# Patient Record
Sex: FEMALE | Race: WHITE | NOT HISPANIC OR LATINO | Employment: FULL TIME | URBAN - METROPOLITAN AREA
[De-identification: names, ages, dates, MRNs, and addresses within clinical notes are randomized per-mention and may not be internally consistent; named-entity substitution may affect disease eponyms.]

---

## 2017-02-21 ENCOUNTER — TRANSCRIBE ORDERS (OUTPATIENT)
Dept: ADMINISTRATIVE | Facility: HOSPITAL | Age: 27
End: 2017-02-21

## 2017-02-21 DIAGNOSIS — R10.11 RUQ ABDOMINAL PAIN: Primary | ICD-10-CM

## 2017-03-02 ENCOUNTER — HOSPITAL ENCOUNTER (OUTPATIENT)
Dept: ULTRASOUND IMAGING | Facility: HOSPITAL | Age: 27
Discharge: HOME | End: 2017-03-02
Attending: INTERNAL MEDICINE | Admitting: INTERNAL MEDICINE

## 2017-03-02 DIAGNOSIS — R10.11 RUQ ABDOMINAL PAIN: ICD-10-CM

## 2017-03-02 PROCEDURE — 76705 ECHO EXAM OF ABDOMEN: CPT

## 2018-06-28 ENCOUNTER — HOSPITAL ENCOUNTER (EMERGENCY)
Facility: HOSPITAL | Age: 28
Discharge: HOME | End: 2018-06-28
Attending: EMERGENCY MEDICINE | Admitting: EMERGENCY MEDICINE

## 2018-06-28 ENCOUNTER — APPOINTMENT (OUTPATIENT)
Dept: GENERAL RADIOLOGY | Facility: HOSPITAL | Age: 28
End: 2018-06-28

## 2018-06-28 VITALS
DIASTOLIC BLOOD PRESSURE: 61 MMHG | SYSTOLIC BLOOD PRESSURE: 112 MMHG | HEIGHT: 63 IN | BODY MASS INDEX: 24.8 KG/M2 | TEMPERATURE: 98.3 F | RESPIRATION RATE: 16 BRPM | WEIGHT: 140 LBS | HEART RATE: 81 BPM | OXYGEN SATURATION: 100 %

## 2018-06-28 DIAGNOSIS — R00.2 HEART PALPITATIONS: Primary | ICD-10-CM

## 2018-06-28 LAB
ALBUMIN SERPL-MCNC: 4.99 G/DL (ref 3.2–4.8)
ALBUMIN/GLOB SERPL: 1.7 G/DL (ref 1.5–2.5)
ALP SERPL-CCNC: 71 U/L (ref 25–100)
ALT SERPL W P-5'-P-CCNC: 12 U/L (ref 7–40)
ANION GAP SERPL CALCULATED.3IONS-SCNC: 9 MMOL/L (ref 3–11)
AST SERPL-CCNC: 15 U/L (ref 0–33)
B-HCG UR QL: NEGATIVE
BACTERIA UR QL AUTO: ABNORMAL /HPF
BASOPHILS # BLD AUTO: 0.05 10*3/MM3 (ref 0–0.2)
BASOPHILS NFR BLD AUTO: 0.7 % (ref 0–1)
BILIRUB SERPL-MCNC: 0.6 MG/DL (ref 0.3–1.2)
BILIRUB UR QL STRIP: NEGATIVE
BUN BLD-MCNC: 11 MG/DL (ref 9–23)
BUN/CREAT SERPL: 13.8 (ref 7–25)
CALCIUM SPEC-SCNC: 9.5 MG/DL (ref 8.7–10.4)
CHLORIDE SERPL-SCNC: 106 MMOL/L (ref 99–109)
CLARITY UR: CLEAR
CO2 SERPL-SCNC: 27 MMOL/L (ref 20–31)
COLOR UR: YELLOW
CREAT BLD-MCNC: 0.8 MG/DL (ref 0.6–1.3)
D DIMER PPP FEU-MCNC: <0.19 MG/L (FEU) (ref 0–0.5)
DEPRECATED RDW RBC AUTO: 43.3 FL (ref 37–54)
EOSINOPHIL # BLD AUTO: 0.18 10*3/MM3 (ref 0–0.3)
EOSINOPHIL NFR BLD AUTO: 2.4 % (ref 0–3)
ERYTHROCYTE [DISTWIDTH] IN BLOOD BY AUTOMATED COUNT: 13.7 % (ref 11.3–14.5)
GFR SERPL CREATININE-BSD FRML MDRD: 84 ML/MIN/1.73
GLOBULIN UR ELPH-MCNC: 3 GM/DL
GLUCOSE BLD-MCNC: 109 MG/DL (ref 70–100)
GLUCOSE UR STRIP-MCNC: NEGATIVE MG/DL
HCT VFR BLD AUTO: 37.5 % (ref 34.5–44)
HGB BLD-MCNC: 12.3 G/DL (ref 11.5–15.5)
HGB UR QL STRIP.AUTO: NEGATIVE
HOLD SPECIMEN: NORMAL
HOLD SPECIMEN: NORMAL
HYALINE CASTS UR QL AUTO: ABNORMAL /LPF
IMM GRANULOCYTES # BLD: 0.01 10*3/MM3 (ref 0–0.03)
IMM GRANULOCYTES NFR BLD: 0.1 % (ref 0–0.6)
INTERNAL NEGATIVE CONTROL: NEGATIVE
INTERNAL POSITIVE CONTROL: POSITIVE
KETONES UR QL STRIP: NEGATIVE
LEUKOCYTE ESTERASE UR QL STRIP.AUTO: ABNORMAL
LYMPHOCYTES # BLD AUTO: 2.74 10*3/MM3 (ref 0.6–4.8)
LYMPHOCYTES NFR BLD AUTO: 37.2 % (ref 24–44)
Lab: NORMAL
MCH RBC QN AUTO: 28.7 PG (ref 27–31)
MCHC RBC AUTO-ENTMCNC: 32.8 G/DL (ref 32–36)
MCV RBC AUTO: 87.6 FL (ref 80–99)
MONOCYTES # BLD AUTO: 0.51 10*3/MM3 (ref 0–1)
MONOCYTES NFR BLD AUTO: 6.9 % (ref 0–12)
NEUTROPHILS # BLD AUTO: 3.89 10*3/MM3 (ref 1.5–8.3)
NEUTROPHILS NFR BLD AUTO: 52.8 % (ref 41–71)
NITRITE UR QL STRIP: NEGATIVE
NRBC BLD MANUAL-RTO: 0 /100 WBC (ref 0–0)
PH UR STRIP.AUTO: 8.5 [PH] (ref 5–8)
PLATELET # BLD AUTO: 333 10*3/MM3 (ref 150–450)
PMV BLD AUTO: 9.3 FL (ref 6–12)
POTASSIUM BLD-SCNC: 3.8 MMOL/L (ref 3.5–5.5)
PROT SERPL-MCNC: 8 G/DL (ref 5.7–8.2)
PROT UR QL STRIP: NEGATIVE
RBC # BLD AUTO: 4.28 10*6/MM3 (ref 3.89–5.14)
RBC # UR: ABNORMAL /HPF
REF LAB TEST METHOD: ABNORMAL
SODIUM BLD-SCNC: 142 MMOL/L (ref 132–146)
SP GR UR STRIP: 1.01 (ref 1–1.03)
SQUAMOUS #/AREA URNS HPF: ABNORMAL /HPF
TROPONIN I SERPL-MCNC: 0 NG/ML (ref 0–0.07)
TSH SERPL DL<=0.05 MIU/L-ACNC: 1.64 MIU/ML (ref 0.35–5.35)
UROBILINOGEN UR QL STRIP: ABNORMAL
WBC NRBC COR # BLD: 7.37 10*3/MM3 (ref 3.5–10.8)
WBC UR QL AUTO: ABNORMAL /HPF
WHOLE BLOOD HOLD SPECIMEN: NORMAL
WHOLE BLOOD HOLD SPECIMEN: NORMAL

## 2018-06-28 PROCEDURE — 71045 X-RAY EXAM CHEST 1 VIEW: CPT

## 2018-06-28 PROCEDURE — 84443 ASSAY THYROID STIM HORMONE: CPT | Performed by: NURSE PRACTITIONER

## 2018-06-28 PROCEDURE — 84484 ASSAY OF TROPONIN QUANT: CPT

## 2018-06-28 PROCEDURE — 85379 FIBRIN DEGRADATION QUANT: CPT | Performed by: NURSE PRACTITIONER

## 2018-06-28 PROCEDURE — 85025 COMPLETE CBC W/AUTO DIFF WBC: CPT | Performed by: NURSE PRACTITIONER

## 2018-06-28 PROCEDURE — 99284 EMERGENCY DEPT VISIT MOD MDM: CPT

## 2018-06-28 PROCEDURE — 93005 ELECTROCARDIOGRAM TRACING: CPT | Performed by: EMERGENCY MEDICINE

## 2018-06-28 PROCEDURE — 96360 HYDRATION IV INFUSION INIT: CPT

## 2018-06-28 PROCEDURE — 36415 COLL VENOUS BLD VENIPUNCTURE: CPT

## 2018-06-28 PROCEDURE — 80053 COMPREHEN METABOLIC PANEL: CPT | Performed by: NURSE PRACTITIONER

## 2018-06-28 PROCEDURE — 81001 URINALYSIS AUTO W/SCOPE: CPT | Performed by: NURSE PRACTITIONER

## 2018-06-28 RX ORDER — SODIUM CHLORIDE 0.9 % (FLUSH) 0.9 %
10 SYRINGE (ML) INJECTION AS NEEDED
Status: DISCONTINUED | OUTPATIENT
Start: 2018-06-28 | End: 2018-06-28 | Stop reason: HOSPADM

## 2018-06-28 RX ORDER — ESOMEPRAZOLE MAGNESIUM 40 MG/1
40 CAPSULE, DELAYED RELEASE ORAL
COMMUNITY
End: 2020-03-21 | Stop reason: HOSPADM

## 2018-06-28 RX ADMIN — SODIUM CHLORIDE 1000 ML: 9 INJECTION, SOLUTION INTRAVENOUS at 02:36

## 2018-07-02 PROBLEM — R00.2 PALPITATIONS: Status: ACTIVE | Noted: 2018-07-02

## 2019-03-11 ENCOUNTER — TRANSCRIBE ORDERS (OUTPATIENT)
Dept: ADMINISTRATIVE | Facility: HOSPITAL | Age: 29
End: 2019-03-11

## 2019-03-11 DIAGNOSIS — R00.2 HEART PALPITATIONS: Primary | ICD-10-CM

## 2019-03-13 ENCOUNTER — HOSPITAL ENCOUNTER (OUTPATIENT)
Dept: CARDIOLOGY | Facility: HOSPITAL | Age: 29
Discharge: HOME | End: 2019-03-13
Admitting: STUDENT IN AN ORGANIZED HEALTH CARE EDUCATION/TRAINING PROGRAM

## 2019-03-13 VITALS — WEIGHT: 137 LBS | BODY MASS INDEX: 24.27 KG/M2 | HEIGHT: 63 IN

## 2019-03-13 DIAGNOSIS — R00.2 HEART PALPITATIONS: ICD-10-CM

## 2019-03-13 PROCEDURE — 93306 TTE W/DOPPLER COMPLETE: CPT | Performed by: INTERNAL MEDICINE

## 2019-03-13 PROCEDURE — 93306 TTE W/DOPPLER COMPLETE: CPT

## 2019-03-14 LAB
BH CV ECHO MEAS - AO ROOT AREA (BSA CORRECTED): 1.4
BH CV ECHO MEAS - AO ROOT AREA: 4.2 CM^2
BH CV ECHO MEAS - AO ROOT DIAM: 2.3 CM
BH CV ECHO MEAS - BSA(HAYCOCK): 1.7 M^2
BH CV ECHO MEAS - BSA: 1.6 M^2
BH CV ECHO MEAS - BZI_BMI: 24.3 KILOGRAMS/M^2
BH CV ECHO MEAS - BZI_METRIC_HEIGHT: 160 CM
BH CV ECHO MEAS - BZI_METRIC_WEIGHT: 62.1 KG
BH CV ECHO MEAS - EDV(CUBED): 77.9 ML
BH CV ECHO MEAS - EDV(MOD-SP2): 45 ML
BH CV ECHO MEAS - EDV(MOD-SP4): 48 ML
BH CV ECHO MEAS - EDV(TEICH): 81.7 ML
BH CV ECHO MEAS - EF(CUBED): 74.2 %
BH CV ECHO MEAS - EF(MOD-BP): 65 %
BH CV ECHO MEAS - EF(MOD-SP2): 66.7 %
BH CV ECHO MEAS - EF(MOD-SP4): 62.5 %
BH CV ECHO MEAS - EF(TEICH): 66.3 %
BH CV ECHO MEAS - ESV(CUBED): 20.1 ML
BH CV ECHO MEAS - ESV(MOD-SP2): 15 ML
BH CV ECHO MEAS - ESV(MOD-SP4): 18 ML
BH CV ECHO MEAS - ESV(TEICH): 27.5 ML
BH CV ECHO MEAS - FS: 36.3 %
BH CV ECHO MEAS - IVS/LVPW: 0.98
BH CV ECHO MEAS - IVSD: 0.9 CM
BH CV ECHO MEAS - LA DIMENSION: 2.6 CM
BH CV ECHO MEAS - LA/AO: 1.1
BH CV ECHO MEAS - LAD MAJOR: 4.5 CM
BH CV ECHO MEAS - LAT PEAK E' VEL: 16.4 CM/SEC
BH CV ECHO MEAS - LATERAL E/E' RATIO: 4.9
BH CV ECHO MEAS - LV DIASTOLIC VOL/BSA (35-75): 29.2 ML/M^2
BH CV ECHO MEAS - LV MASS(C)D: 124.2 GRAMS
BH CV ECHO MEAS - LV MASS(C)DI: 75.4 GRAMS/M^2
BH CV ECHO MEAS - LV SYSTOLIC VOL/BSA (12-30): 10.9 ML/M^2
BH CV ECHO MEAS - LVIDD: 4.3 CM
BH CV ECHO MEAS - LVIDS: 2.7 CM
BH CV ECHO MEAS - LVLD AP2: 6 CM
BH CV ECHO MEAS - LVLD AP4: 6.8 CM
BH CV ECHO MEAS - LVLS AP2: 5.2 CM
BH CV ECHO MEAS - LVLS AP4: 5.4 CM
BH CV ECHO MEAS - LVPWD: 0.92 CM
BH CV ECHO MEAS - MED PEAK E' VEL: 11.7 CM/SEC
BH CV ECHO MEAS - MEDIAL E/E' RATIO: 6.9
BH CV ECHO MEAS - MV A MAX VEL: 53.8 CM/SEC
BH CV ECHO MEAS - MV DEC SLOPE: 387 CM/SEC^2
BH CV ECHO MEAS - MV DEC TIME: 0.24 SEC
BH CV ECHO MEAS - MV E MAX VEL: 80.9 CM/SEC
BH CV ECHO MEAS - MV E/A: 1.5
BH CV ECHO MEAS - MV P1/2T MAX VEL: 111 CM/SEC
BH CV ECHO MEAS - MV P1/2T: 84 MSEC
BH CV ECHO MEAS - MVA P1/2T LCG: 2 CM^2
BH CV ECHO MEAS - MVA(P1/2T): 2.6 CM^2
BH CV ECHO MEAS - PA ACC SLOPE: 501.5 CM/SEC^2
BH CV ECHO MEAS - PA ACC TIME: 0.15 SEC
BH CV ECHO MEAS - PA PR(ACCEL): 13.3 MMHG
BH CV ECHO MEAS - PULM DIAS VEL: 69.6 CM/SEC
BH CV ECHO MEAS - PULM S/D: 0.85
BH CV ECHO MEAS - PULM SYS VEL: 59.2 CM/SEC
BH CV ECHO MEAS - SI(CUBED): 35.1 ML/M^2
BH CV ECHO MEAS - SI(MOD-SP2): 18.2 ML/M^2
BH CV ECHO MEAS - SI(MOD-SP4): 18.2 ML/M^2
BH CV ECHO MEAS - SI(TEICH): 32.9 ML/M^2
BH CV ECHO MEAS - SV(CUBED): 57.7 ML
BH CV ECHO MEAS - SV(MOD-SP2): 30 ML
BH CV ECHO MEAS - SV(MOD-SP4): 30 ML
BH CV ECHO MEAS - SV(TEICH): 54.2 ML
BH CV ECHO MEAS - TAPSE (>1.6): 2.5 CM2
BH CV ECHO MEAS - TR MAX PG: 17 MMHG
BH CV ECHO MEAS - TR MAX VEL: 207.5 CM/SEC
BH CV ECHO MEASUREMENTS AVERAGE E/E' RATIO: 5.76
BH CV VAS BP RIGHT ARM: NORMAL MMHG
BH CV XLRA - RV BASE: 2.7 CM
BH CV XLRA - RV LENGTH: 5.4 CM
BH CV XLRA - RV MID: 2.4 CM
BH CV XLRA - TDI S': 13.7 CM/SEC
LEFT ATRIUM VOLUME INDEX: 18.2 ML/M^2
LEFT ATRIUM VOLUME: 30 ML
LV EF 2D ECHO EST: 65 %
MAXIMAL PREDICTED HEART RATE: 190 BPM
STRESS TARGET HR: 162 BPM

## 2019-09-03 ENCOUNTER — TRANSCRIBE ORDERS (OUTPATIENT)
Dept: LAB | Facility: HOSPITAL | Age: 29
End: 2019-09-03

## 2019-09-03 ENCOUNTER — LAB REQUISITION (OUTPATIENT)
Dept: LAB | Facility: HOSPITAL | Age: 29
End: 2019-09-03

## 2019-09-03 DIAGNOSIS — Z34.81 PRENATAL CARE, SUBSEQUENT PREGNANCY, FIRST TRIMESTER: ICD-10-CM

## 2019-09-03 DIAGNOSIS — Z00.00 ROUTINE GENERAL MEDICAL EXAMINATION AT A HEALTH CARE FACILITY: ICD-10-CM

## 2019-09-03 DIAGNOSIS — Z3A.12 12 WEEKS GESTATION OF PREGNANCY: Primary | ICD-10-CM

## 2019-09-03 LAB
ABO GROUP BLD: NORMAL
BASOPHILS # BLD AUTO: 0.03 10*3/MM3 (ref 0–0.2)
BASOPHILS NFR BLD AUTO: 0.3 % (ref 0–1.5)
BLD GP AB SCN SERPL QL: NEGATIVE
DEPRECATED RDW RBC AUTO: 40.8 FL (ref 37–54)
EOSINOPHIL # BLD AUTO: 0.09 10*3/MM3 (ref 0–0.4)
EOSINOPHIL NFR BLD AUTO: 0.9 % (ref 0.3–6.2)
ERYTHROCYTE [DISTWIDTH] IN BLOOD BY AUTOMATED COUNT: 12 % (ref 12.3–15.4)
GLUCOSE BLD-MCNC: 81 MG/DL (ref 65–99)
HBV SURFACE AG SERPL QL IA: NORMAL
HCT VFR BLD AUTO: 35.6 % (ref 34–46.6)
HCV AB SER DONR QL: NORMAL
HGB BLD-MCNC: 11.9 G/DL (ref 12–15.9)
HIV1+2 AB SER QL: NORMAL
IMM GRANULOCYTES # BLD AUTO: 0.03 10*3/MM3 (ref 0–0.05)
IMM GRANULOCYTES NFR BLD AUTO: 0.3 % (ref 0–0.5)
LYMPHOCYTES # BLD AUTO: 1.85 10*3/MM3 (ref 0.7–3.1)
LYMPHOCYTES NFR BLD AUTO: 18.6 % (ref 19.6–45.3)
MCH RBC QN AUTO: 31 PG (ref 26.6–33)
MCHC RBC AUTO-ENTMCNC: 33.4 G/DL (ref 31.5–35.7)
MCV RBC AUTO: 92.7 FL (ref 79–97)
MONOCYTES # BLD AUTO: 0.48 10*3/MM3 (ref 0.1–0.9)
MONOCYTES NFR BLD AUTO: 4.8 % (ref 5–12)
NEUTROPHILS # BLD AUTO: 7.46 10*3/MM3 (ref 1.7–7)
NEUTROPHILS NFR BLD AUTO: 75.1 % (ref 42.7–76)
NRBC BLD AUTO-RTO: 0 /100 WBC (ref 0–0.2)
PLATELET # BLD AUTO: 286 10*3/MM3 (ref 140–450)
PMV BLD AUTO: 9.9 FL (ref 6–12)
RBC # BLD AUTO: 3.84 10*6/MM3 (ref 3.77–5.28)
RH BLD: POSITIVE
RPR SER QL: NORMAL
RUBV IGG SERPL IA-ACNC: POSITIVE
TSH SERPL DL<=0.05 MIU/L-ACNC: 0.3 UIU/ML (ref 0.27–4.2)
WBC NRBC COR # BLD: 9.94 10*3/MM3 (ref 3.4–10.8)

## 2019-09-03 PROCEDURE — 80081 OBSTETRIC PANEL INC HIV TSTG: CPT | Performed by: NURSE PRACTITIONER

## 2019-09-03 PROCEDURE — 36415 COLL VENOUS BLD VENIPUNCTURE: CPT | Performed by: NURSE PRACTITIONER

## 2019-09-03 PROCEDURE — 86803 HEPATITIS C AB TEST: CPT | Performed by: NURSE PRACTITIONER

## 2019-09-03 PROCEDURE — 36415 COLL VENOUS BLD VENIPUNCTURE: CPT

## 2019-09-03 PROCEDURE — 84443 ASSAY THYROID STIM HORMONE: CPT | Performed by: NURSE PRACTITIONER

## 2019-09-03 PROCEDURE — 82947 ASSAY GLUCOSE BLOOD QUANT: CPT | Performed by: NURSE PRACTITIONER

## 2019-12-30 ENCOUNTER — LAB (OUTPATIENT)
Dept: LAB | Facility: HOSPITAL | Age: 29
End: 2019-12-30

## 2019-12-30 ENCOUNTER — TRANSCRIBE ORDERS (OUTPATIENT)
Dept: LAB | Facility: HOSPITAL | Age: 29
End: 2019-12-30

## 2019-12-30 DIAGNOSIS — Z3A.28 28 WEEKS GESTATION OF PREGNANCY: Primary | ICD-10-CM

## 2019-12-30 DIAGNOSIS — Z3A.28 28 WEEKS GESTATION OF PREGNANCY: ICD-10-CM

## 2019-12-30 LAB
BLD GP AB SCN SERPL QL: NEGATIVE
DEPRECATED RDW RBC AUTO: 40.1 FL (ref 37–54)
ERYTHROCYTE [DISTWIDTH] IN BLOOD BY AUTOMATED COUNT: 12.2 % (ref 12.3–15.4)
GLUCOSE 1H P 100 G GLC PO SERPL-MCNC: 103 MG/DL (ref 65–140)
HCT VFR BLD AUTO: 31.6 % (ref 34–46.6)
HGB BLD-MCNC: 10.9 G/DL (ref 12–15.9)
MCH RBC QN AUTO: 31.6 PG (ref 26.6–33)
MCHC RBC AUTO-ENTMCNC: 34.5 G/DL (ref 31.5–35.7)
MCV RBC AUTO: 91.6 FL (ref 79–97)
PLATELET # BLD AUTO: 270 10*3/MM3 (ref 140–450)
PMV BLD AUTO: 10.1 FL (ref 6–12)
RBC # BLD AUTO: 3.45 10*6/MM3 (ref 3.77–5.28)
WBC NRBC COR # BLD: 11.67 10*3/MM3 (ref 3.4–10.8)

## 2019-12-30 PROCEDURE — 85027 COMPLETE CBC AUTOMATED: CPT

## 2019-12-30 PROCEDURE — 36415 COLL VENOUS BLD VENIPUNCTURE: CPT

## 2019-12-30 PROCEDURE — 86850 RBC ANTIBODY SCREEN: CPT

## 2019-12-30 PROCEDURE — 82950 GLUCOSE TEST: CPT

## 2020-03-05 ENCOUNTER — TRANSCRIBE ORDERS (OUTPATIENT)
Dept: LAB | Facility: HOSPITAL | Age: 30
End: 2020-03-05

## 2020-03-05 ENCOUNTER — LAB (OUTPATIENT)
Dept: LAB | Facility: HOSPITAL | Age: 30
End: 2020-03-05

## 2020-03-05 DIAGNOSIS — Z34.83 PRENATAL CARE, SUBSEQUENT PREGNANCY, THIRD TRIMESTER: ICD-10-CM

## 2020-03-05 DIAGNOSIS — Z3A.36 36 WEEKS GESTATION OF PREGNANCY: ICD-10-CM

## 2020-03-05 DIAGNOSIS — Z3A.36 36 WEEKS GESTATION OF PREGNANCY: Primary | ICD-10-CM

## 2020-03-05 LAB
HCT VFR BLD AUTO: 31.7 % (ref 34–46.6)
HGB BLD-MCNC: 10.8 G/DL (ref 12–15.9)

## 2020-03-05 PROCEDURE — 85014 HEMATOCRIT: CPT

## 2020-03-05 PROCEDURE — 85018 HEMOGLOBIN: CPT

## 2020-03-05 PROCEDURE — 36415 COLL VENOUS BLD VENIPUNCTURE: CPT

## 2020-03-19 ENCOUNTER — ANESTHESIA EVENT (OUTPATIENT)
Dept: LABOR AND DELIVERY | Facility: HOSPITAL | Age: 30
End: 2020-03-19

## 2020-03-19 ENCOUNTER — HOSPITAL ENCOUNTER (INPATIENT)
Facility: HOSPITAL | Age: 30
LOS: 2 days | Discharge: HOME | End: 2020-03-21
Attending: NURSE PRACTITIONER | Admitting: OBSTETRICS & GYNECOLOGY

## 2020-03-19 ENCOUNTER — ANESTHESIA (OUTPATIENT)
Dept: LABOR AND DELIVERY | Facility: HOSPITAL | Age: 30
End: 2020-03-19

## 2020-03-19 PROBLEM — Z37.9 NORMAL LABOR: Status: RESOLVED | Noted: 2020-03-19 | Resolved: 2020-03-19

## 2020-03-19 PROBLEM — Z37.9 NORMAL LABOR: Status: ACTIVE | Noted: 2020-03-19

## 2020-03-19 LAB
ABO GROUP BLD: NORMAL
ATMOSPHERIC PRESS: ABNORMAL MM[HG]
ATMOSPHERIC PRESS: ABNORMAL MM[HG]
BASE EXCESS BLDCOA CALC-SCNC: -5.1 MMOL/L (ref 0–2)
BASE EXCESS BLDCOV CALC-SCNC: -3.5 MMOL/L (ref 0–2)
BDY SITE: ABNORMAL
BDY SITE: ABNORMAL
BLD GP AB SCN SERPL QL: NEGATIVE
BODY TEMPERATURE: 37 C
BODY TEMPERATURE: 37 C
CO2 BLDA-SCNC: 21.1 MMOL/L (ref 22–33)
CO2 BLDA-SCNC: 21.8 MMOL/L (ref 22–33)
COLLECT TME SMN: ABNORMAL
DEPRECATED RDW RBC AUTO: 43.1 FL (ref 37–54)
ERYTHROCYTE [DISTWIDTH] IN BLOOD BY AUTOMATED COUNT: 12.9 % (ref 12.3–15.4)
HCO3 BLDCOA-SCNC: 19.9 MMOL/L (ref 16.9–20.5)
HCO3 BLDCOV-SCNC: 20.8 MMOL/L (ref 18.6–21.4)
HCT VFR BLD AUTO: 31.4 % (ref 34–46.6)
HGB BLD-MCNC: 10.5 G/DL (ref 12–15.9)
HGB BLDA-MCNC: 15.3 G/DL (ref 14–18)
HGB BLDA-MCNC: 15.5 G/DL (ref 14–18)
HOROWITZ INDEX BLD+IHG-RTO: 21 %
HOROWITZ INDEX BLD+IHG-RTO: 21 %
MCH RBC QN AUTO: 30.7 PG (ref 26.6–33)
MCHC RBC AUTO-ENTMCNC: 33.4 G/DL (ref 31.5–35.7)
MCV RBC AUTO: 91.8 FL (ref 79–97)
MODALITY: ABNORMAL
MODALITY: ABNORMAL
NOTE: ABNORMAL
NOTE: ABNORMAL
PCO2 BLDCOA: 36.6 MMHG (ref 43.3–54.9)
PCO2 BLDCOV: 34.7 MM HG
PH BLDCOA: 7.35 PH UNITS (ref 7.22–7.3)
PH BLDCOV: 7.38 PH UNITS
PLATELET # BLD AUTO: 338 10*3/MM3 (ref 140–450)
PMV BLD AUTO: 10.4 FL (ref 6–12)
PO2 BLDCOA: 37.3 MMHG (ref 11.5–43.3)
PO2 BLDCOV: 28.6 MM HG
RBC # BLD AUTO: 3.42 10*6/MM3 (ref 3.77–5.28)
RH BLD: POSITIVE
SAO2 % BLDCOA: 80.3 %
SAO2 % BLDCOA: ABNORMAL %
SAO2 % BLDCOV: 67.8 %
T&S EXPIRATION DATE: NORMAL
WBC NRBC COR # BLD: 18.93 10*3/MM3 (ref 3.4–10.8)

## 2020-03-19 PROCEDURE — 36415 COLL VENOUS BLD VENIPUNCTURE: CPT | Performed by: OBSTETRICS & GYNECOLOGY

## 2020-03-19 PROCEDURE — C1755 CATHETER, INTRASPINAL: HCPCS | Performed by: ANESTHESIOLOGY

## 2020-03-19 PROCEDURE — 86900 BLOOD TYPING SEROLOGIC ABO: CPT | Performed by: OBSTETRICS & GYNECOLOGY

## 2020-03-19 PROCEDURE — 82805 BLOOD GASES W/O2 SATURATION: CPT

## 2020-03-19 PROCEDURE — 85027 COMPLETE CBC AUTOMATED: CPT | Performed by: OBSTETRICS & GYNECOLOGY

## 2020-03-19 PROCEDURE — 59025 FETAL NON-STRESS TEST: CPT

## 2020-03-19 PROCEDURE — 25010000002 ONDANSETRON PER 1 MG: Performed by: NURSE ANESTHETIST, CERTIFIED REGISTERED

## 2020-03-19 PROCEDURE — 86850 RBC ANTIBODY SCREEN: CPT | Performed by: OBSTETRICS & GYNECOLOGY

## 2020-03-19 PROCEDURE — C1755 CATHETER, INTRASPINAL: HCPCS

## 2020-03-19 PROCEDURE — 10907ZC DRAINAGE OF AMNIOTIC FLUID, THERAPEUTIC FROM PRODUCTS OF CONCEPTION, VIA NATURAL OR ARTIFICIAL OPENING: ICD-10-PCS | Performed by: ADVANCED PRACTICE MIDWIFE

## 2020-03-19 PROCEDURE — 84112 EVAL AMNIOTIC FLUID PROTEIN: CPT | Performed by: ADVANCED PRACTICE MIDWIFE

## 2020-03-19 PROCEDURE — 86901 BLOOD TYPING SEROLOGIC RH(D): CPT | Performed by: OBSTETRICS & GYNECOLOGY

## 2020-03-19 PROCEDURE — 25010000002 FENTANYL CITRATE (PF) 100 MCG/2ML SOLUTION: Performed by: NURSE ANESTHETIST, CERTIFIED REGISTERED

## 2020-03-19 PROCEDURE — 25010000002 ROPIVACAINE PER 1 MG: Performed by: NURSE ANESTHETIST, CERTIFIED REGISTERED

## 2020-03-19 PROCEDURE — 51703 INSERT BLADDER CATH COMPLEX: CPT

## 2020-03-19 RX ORDER — FENTANYL CITRATE 50 UG/ML
INJECTION, SOLUTION INTRAMUSCULAR; INTRAVENOUS AS NEEDED
Status: DISCONTINUED | OUTPATIENT
Start: 2020-03-19 | End: 2020-03-19 | Stop reason: SURG

## 2020-03-19 RX ORDER — SODIUM CHLORIDE 0.9 % (FLUSH) 0.9 %
1-10 SYRINGE (ML) INJECTION AS NEEDED
Status: DISCONTINUED | OUTPATIENT
Start: 2020-03-19 | End: 2020-03-21 | Stop reason: HOSPADM

## 2020-03-19 RX ORDER — ACETAMINOPHEN 325 MG/1
650 TABLET ORAL EVERY 4 HOURS PRN
Status: DISCONTINUED | OUTPATIENT
Start: 2020-03-19 | End: 2020-03-19 | Stop reason: HOSPADM

## 2020-03-19 RX ORDER — SODIUM CHLORIDE 0.9 % (FLUSH) 0.9 %
3 SYRINGE (ML) INJECTION EVERY 12 HOURS SCHEDULED
Status: DISCONTINUED | OUTPATIENT
Start: 2020-03-19 | End: 2020-03-19 | Stop reason: HOSPADM

## 2020-03-19 RX ORDER — MORPHINE SULFATE 2 MG/ML
2 INJECTION, SOLUTION INTRAMUSCULAR; INTRAVENOUS
Status: DISCONTINUED | OUTPATIENT
Start: 2020-03-19 | End: 2020-03-19 | Stop reason: HOSPADM

## 2020-03-19 RX ORDER — PROMETHAZINE HYDROCHLORIDE 25 MG/ML
12.5 INJECTION, SOLUTION INTRAMUSCULAR; INTRAVENOUS EVERY 6 HOURS PRN
Status: DISCONTINUED | OUTPATIENT
Start: 2020-03-19 | End: 2020-03-19 | Stop reason: HOSPADM

## 2020-03-19 RX ORDER — ONDANSETRON 2 MG/ML
4 INJECTION INTRAMUSCULAR; INTRAVENOUS EVERY 6 HOURS PRN
Status: DISCONTINUED | OUTPATIENT
Start: 2020-03-19 | End: 2020-03-21 | Stop reason: HOSPADM

## 2020-03-19 RX ORDER — OXYCODONE HYDROCHLORIDE AND ACETAMINOPHEN 5; 325 MG/1; MG/1
2 TABLET ORAL EVERY 4 HOURS PRN
Status: DISCONTINUED | OUTPATIENT
Start: 2020-03-19 | End: 2020-03-19 | Stop reason: HOSPADM

## 2020-03-19 RX ORDER — FAMOTIDINE 10 MG/ML
20 INJECTION, SOLUTION INTRAVENOUS ONCE AS NEEDED
Status: DISCONTINUED | OUTPATIENT
Start: 2020-03-19 | End: 2020-03-19 | Stop reason: HOSPADM

## 2020-03-19 RX ORDER — PRENATAL VIT/IRON FUM/FOLIC AC 27MG-0.8MG
1 TABLET ORAL DAILY
Status: DISCONTINUED | OUTPATIENT
Start: 2020-03-20 | End: 2020-03-21 | Stop reason: HOSPADM

## 2020-03-19 RX ORDER — OXYTOCIN-SODIUM CHLORIDE 0.9% IV SOLN 30 UNIT/500ML 30-0.9/5 UT/ML-%
650 SOLUTION INTRAVENOUS ONCE
Status: COMPLETED | OUTPATIENT
Start: 2020-03-19 | End: 2020-03-19

## 2020-03-19 RX ORDER — SODIUM CHLORIDE, SODIUM LACTATE, POTASSIUM CHLORIDE, CALCIUM CHLORIDE 600; 310; 30; 20 MG/100ML; MG/100ML; MG/100ML; MG/100ML
125 INJECTION, SOLUTION INTRAVENOUS CONTINUOUS
Status: DISCONTINUED | OUTPATIENT
Start: 2020-03-19 | End: 2020-03-21 | Stop reason: HOSPADM

## 2020-03-19 RX ORDER — DIPHENHYDRAMINE HYDROCHLORIDE 50 MG/ML
12.5 INJECTION INTRAMUSCULAR; INTRAVENOUS EVERY 8 HOURS PRN
Status: DISCONTINUED | OUTPATIENT
Start: 2020-03-19 | End: 2020-03-19 | Stop reason: HOSPADM

## 2020-03-19 RX ORDER — PROMETHAZINE HYDROCHLORIDE 12.5 MG/1
12.5 TABLET ORAL EVERY 6 HOURS PRN
Status: DISCONTINUED | OUTPATIENT
Start: 2020-03-19 | End: 2020-03-19 | Stop reason: HOSPADM

## 2020-03-19 RX ORDER — SODIUM CHLORIDE, SODIUM LACTATE, POTASSIUM CHLORIDE, CALCIUM CHLORIDE 600; 310; 30; 20 MG/100ML; MG/100ML; MG/100ML; MG/100ML
150 INJECTION, SOLUTION INTRAVENOUS CONTINUOUS
Status: DISCONTINUED | OUTPATIENT
Start: 2020-03-19 | End: 2020-03-21 | Stop reason: HOSPADM

## 2020-03-19 RX ORDER — METOCLOPRAMIDE HYDROCHLORIDE 5 MG/ML
10 INJECTION INTRAMUSCULAR; INTRAVENOUS ONCE AS NEEDED
Status: DISCONTINUED | OUTPATIENT
Start: 2020-03-19 | End: 2020-03-19 | Stop reason: HOSPADM

## 2020-03-19 RX ORDER — ONDANSETRON 2 MG/ML
4 INJECTION INTRAMUSCULAR; INTRAVENOUS ONCE AS NEEDED
Status: COMPLETED | OUTPATIENT
Start: 2020-03-19 | End: 2020-03-19

## 2020-03-19 RX ORDER — LIDOCAINE HYDROCHLORIDE AND EPINEPHRINE 15; 5 MG/ML; UG/ML
INJECTION, SOLUTION EPIDURAL AS NEEDED
Status: DISCONTINUED | OUTPATIENT
Start: 2020-03-19 | End: 2020-03-19 | Stop reason: SURG

## 2020-03-19 RX ORDER — IBUPROFEN 600 MG/1
600 TABLET ORAL EVERY 6 HOURS PRN
Status: DISCONTINUED | OUTPATIENT
Start: 2020-03-19 | End: 2020-03-21 | Stop reason: HOSPADM

## 2020-03-19 RX ORDER — OXYTOCIN-SODIUM CHLORIDE 0.9% IV SOLN 30 UNIT/500ML 30-0.9/5 UT/ML-%
85 SOLUTION INTRAVENOUS ONCE
Status: DISCONTINUED | OUTPATIENT
Start: 2020-03-19 | End: 2020-03-19 | Stop reason: HOSPADM

## 2020-03-19 RX ORDER — EPHEDRINE SULFATE/0.9% NACL/PF 25 MG/5 ML
10 SYRINGE (ML) INTRAVENOUS
Status: DISCONTINUED | OUTPATIENT
Start: 2020-03-19 | End: 2020-03-19 | Stop reason: HOSPADM

## 2020-03-19 RX ORDER — DOCUSATE SODIUM 100 MG/1
100 CAPSULE, LIQUID FILLED ORAL 2 TIMES DAILY PRN
Status: DISCONTINUED | OUTPATIENT
Start: 2020-03-19 | End: 2020-03-21 | Stop reason: HOSPADM

## 2020-03-19 RX ORDER — OXYTOCIN-SODIUM CHLORIDE 0.9% IV SOLN 30 UNIT/500ML 30-0.9/5 UT/ML-%
2 SOLUTION INTRAVENOUS
Status: DISCONTINUED | OUTPATIENT
Start: 2020-03-19 | End: 2020-03-21 | Stop reason: HOSPADM

## 2020-03-19 RX ORDER — LANOLIN
CREAM (ML) TOPICAL
Status: DISCONTINUED | OUTPATIENT
Start: 2020-03-19 | End: 2020-03-21 | Stop reason: HOSPADM

## 2020-03-19 RX ORDER — SODIUM CHLORIDE, SODIUM LACTATE, POTASSIUM CHLORIDE, CALCIUM CHLORIDE 600; 310; 30; 20 MG/100ML; MG/100ML; MG/100ML; MG/100ML
150 INJECTION, SOLUTION INTRAVENOUS CONTINUOUS
Status: CANCELLED | OUTPATIENT
Start: 2020-03-19

## 2020-03-19 RX ORDER — OXYTOCIN 10 [USP'U]/ML
10 INJECTION, SOLUTION INTRAMUSCULAR; INTRAVENOUS ONCE
Status: COMPLETED | OUTPATIENT
Start: 2020-03-19 | End: 2020-03-19

## 2020-03-19 RX ORDER — LIDOCAINE HYDROCHLORIDE 10 MG/ML
5 INJECTION, SOLUTION EPIDURAL; INFILTRATION; INTRACAUDAL; PERINEURAL AS NEEDED
Status: DISCONTINUED | OUTPATIENT
Start: 2020-03-19 | End: 2020-03-19 | Stop reason: HOSPADM

## 2020-03-19 RX ORDER — PROMETHAZINE HYDROCHLORIDE 12.5 MG/1
12.5 SUPPOSITORY RECTAL EVERY 6 HOURS PRN
Status: DISCONTINUED | OUTPATIENT
Start: 2020-03-19 | End: 2020-03-19 | Stop reason: HOSPADM

## 2020-03-19 RX ORDER — IBUPROFEN 600 MG/1
600 TABLET ORAL EVERY 6 HOURS PRN
Status: DISCONTINUED | OUTPATIENT
Start: 2020-03-19 | End: 2020-03-19 | Stop reason: HOSPADM

## 2020-03-19 RX ORDER — TRISODIUM CITRATE DIHYDRATE AND CITRIC ACID MONOHYDRATE 500; 334 MG/5ML; MG/5ML
30 SOLUTION ORAL ONCE
Status: DISCONTINUED | OUTPATIENT
Start: 2020-03-19 | End: 2020-03-19 | Stop reason: HOSPADM

## 2020-03-19 RX ORDER — MAGNESIUM CARB/ALUMINUM HYDROX 105-160MG
30 TABLET,CHEWABLE ORAL ONCE
Status: DISCONTINUED | OUTPATIENT
Start: 2020-03-19 | End: 2020-03-19 | Stop reason: HOSPADM

## 2020-03-19 RX ORDER — SODIUM CHLORIDE 0.9 % (FLUSH) 0.9 %
10 SYRINGE (ML) INJECTION AS NEEDED
Status: DISCONTINUED | OUTPATIENT
Start: 2020-03-19 | End: 2020-03-19 | Stop reason: HOSPADM

## 2020-03-19 RX ADMIN — SODIUM CHLORIDE, POTASSIUM CHLORIDE, SODIUM LACTATE AND CALCIUM CHLORIDE 125 ML/HR: 600; 310; 30; 20 INJECTION, SOLUTION INTRAVENOUS at 09:08

## 2020-03-19 RX ADMIN — OXYTOCIN 10 UNITS: 10 INJECTION, SOLUTION INTRAMUSCULAR; INTRAVENOUS at 21:08

## 2020-03-19 RX ADMIN — ONDANSETRON 4 MG: 2 INJECTION INTRAMUSCULAR; INTRAVENOUS at 13:32

## 2020-03-19 RX ADMIN — LIDOCAINE HYDROCHLORIDE AND EPINEPHRINE 2 ML: 15; 5 INJECTION, SOLUTION EPIDURAL at 12:49

## 2020-03-19 RX ADMIN — LIDOCAINE HYDROCHLORIDE AND EPINEPHRINE 3 ML: 15; 5 INJECTION, SOLUTION EPIDURAL at 12:46

## 2020-03-19 RX ADMIN — ROPIVACAINE HYDROCHLORIDE 10 ML: 5 INJECTION, SOLUTION EPIDURAL; INFILTRATION; PERINEURAL at 12:53

## 2020-03-19 RX ADMIN — Medication 14 ML/HR: at 12:54

## 2020-03-19 RX ADMIN — OXYTOCIN 650 ML/HR: 10 INJECTION INTRAVENOUS at 19:45

## 2020-03-19 RX ADMIN — OXYTOCIN 2 MILLI-UNITS/MIN: 10 INJECTION INTRAVENOUS at 15:35

## 2020-03-19 RX ADMIN — SODIUM CHLORIDE, POTASSIUM CHLORIDE, SODIUM LACTATE AND CALCIUM CHLORIDE 1000 ML: 600; 310; 30; 20 INJECTION, SOLUTION INTRAVENOUS at 09:32

## 2020-03-19 RX ADMIN — ACETAMINOPHEN 650 MG: 325 TABLET, FILM COATED ORAL at 14:56

## 2020-03-19 RX ADMIN — SODIUM CHLORIDE, POTASSIUM CHLORIDE, SODIUM LACTATE AND CALCIUM CHLORIDE 125 ML/HR: 600; 310; 30; 20 INJECTION, SOLUTION INTRAVENOUS at 13:08

## 2020-03-19 RX ADMIN — SODIUM CHLORIDE, POTASSIUM CHLORIDE, SODIUM LACTATE AND CALCIUM CHLORIDE 125 ML/HR: 600; 310; 30; 20 INJECTION, SOLUTION INTRAVENOUS at 10:35

## 2020-03-19 RX ADMIN — FENTANYL CITRATE 100 MCG: 50 INJECTION, SOLUTION INTRAMUSCULAR; INTRAVENOUS at 12:49

## 2020-03-19 RX ADMIN — SODIUM CHLORIDE, POTASSIUM CHLORIDE, SODIUM LACTATE AND CALCIUM CHLORIDE 150 ML/HR: 600; 310; 30; 20 INJECTION, SOLUTION INTRAVENOUS at 16:06

## 2020-03-19 RX ADMIN — SODIUM CHLORIDE, POTASSIUM CHLORIDE, SODIUM LACTATE AND CALCIUM CHLORIDE 300 ML: 600; 310; 30; 20 INJECTION, SOLUTION INTRAVENOUS at 11:32

## 2020-03-19 RX ADMIN — IBUPROFEN 600 MG: 600 TABLET, FILM COATED ORAL at 21:08

## 2020-03-19 RX ADMIN — SODIUM CHLORIDE, POTASSIUM CHLORIDE, SODIUM LACTATE AND CALCIUM CHLORIDE 125 ML/HR: 600; 310; 30; 20 INJECTION, SOLUTION INTRAVENOUS at 18:54

## 2020-03-19 NOTE — ANESTHESIA PROCEDURE NOTES
Labor Epidural      Patient reassessed immediately prior to procedure    Patient location during procedure: floor  Performed By  Anesthesiologist: Lexx Root DO  CRNA: Valencia Gu CRNA  Preanesthetic Checklist  Completed: patient identified, surgical consent, pre-op evaluation, timeout performed, IV checked, risks and benefits discussed and monitors and equipment checked  Prep:  Pt Position:sitting  Sterile Tech:cap, gloves, mask and sterile barrier  Prep:DuraPrep  Monitoring:blood pressure monitoring  Epidural Block Procedure:  Approach:midline  Guidance:landmark technique and palpation technique  Location:L4-L5  Needle Type:Tuohy  Needle Gauge:17 G  Loss of Resistance Medium: air  Loss of Resistance: 4cm  Cath Depth at skin:9 cm  Paresthesia: none  Aspiration:negative  Test Dose:negative  Number of Attempts: 1  Post Assessment:  Dressing:occlusive dressing applied and secured with tape  Pt Tolerance:patient tolerated the procedure well with no apparent complications  Complications:no

## 2020-03-19 NOTE — PROGRESS NOTES
"20  09:24  Shawnee Walker      ASSESSMENTS: Shawnee is breathing lightly through contractions.     /70   Pulse 92   Temp 98.6 °F (37 °C) (Oral)   Resp 14   Ht 160 cm (63\")   Wt 78 kg (172 lb)   Breastfeeding No   BMI 30.47 kg/m²     Fetal Heart Rate Assessment   Method: Fetal HR Assessment Method: external   Beats/min: Fetal HR (beats/min): 120   Baseline: Fetal Heart Baseline Rate: normal range   Varibility: Fetal HR Variability: moderate (amplitude range 6 to 25 bpm)   Accels: Fetal HR Accelerations: greater than/equal to 15 bpm, lasting at least 15 seconds   Decels: Fetal HR Decelerations: variables   Tracing Category:  2     Uterine Assessment   Method: Method: external tocotransducer   Frequency (min): Contraction Frequency (Minutes): x1   Ctx Count in 10 min:     Duration:     Intensity: Contraction Intensity: mild by palpation   Intensity by IUPC:     Resting Tone: Uterine Resting Tone: soft by palpation   Resting Tone by IUPC:     [place] Units:                                Presentation: vertex   Cervix: Exam by: Method: sterile exam per CN   Dilation: Cervical Dilation (cm): 3-4   Effacement: Cervical Effacement: 80%   Station: Fetal Station: -2            Lab Results   Component Value Date    WBC 18.93 (H) 2020    HGB 10.5 (L) 2020    HCT 31.4 (L) 2020    MCV 91.8 2020     2020    AST 15 2018    ALT 12 2018     Results from last 7 days   Lab Units 20  0532   ABO TYPING  O   RH TYPING  Positive   ANTIBODY SCREEN  Negative       PLAN: Talked with patient about current heart rate tracing. Discussed AROM and placing IUPC. Discussed reasons that would potentiate a  delivery. Patient and spouse VU and in agreement. Questions answered. AROM with clear fluid. IUPC placed. Dr Juarez updated on patient status.     Sekou Molina CNM  09:24  20  "

## 2020-03-19 NOTE — H&P
27[facility]  Obstetric History and Physical    Chief Complaint   Patient presents with   • Laboring       Subjective     Patient is a 29 y.o. female  currently at 40w0d, who presents for term labor  without ROM. She voices good fetal movement. She denies leaking of fluid. She states some spotting after exam. She desires a unmedicated labor and birth.     Her prenatal care is benign.  Her previous obstetric/gynecological history is non-contributory.    The following portions of the patients history were reviewed and updated as appropriate: current medications, allergies, past medical history, past surgical history, past family history, past social history and problem list .       Prenatal Information:   Maternal Prenatal Labs  Blood Type ABO Type   Date Value Ref Range Status   2020 O  Final      Rh Status RH type   Date Value Ref Range Status   2020 Positive  Final      Antibody Screen Antibody Screen   Date Value Ref Range Status   2020 Negative  Final      Gonnorhea No results found for: GCCX   Chlamydia No results found for: CLAMYDCU   RPR No results found for: RPR   Syphilis Antibody No results found for: SYPHILIS   Rubella No results found for: RUBELLAIGGIN   Hepatitis B Surface Antigen No results found for: HEPBSAG   HIV-1 Antibody No results found for: LABHIV1   Hepatitis C Antibody No results found for: HEPCAB   Rapid Urin Drug Screen No results found for: AMPMETHU, BARBITSCNUR, LABBENZSCN, LABMETHSCN, LABOPIASCN, THCURSCR, COCAINEUR, AMPHETSCREEN, PROPOXSCN, BUPRENORSCNU, METAMPSCNUR, OXYCODONESCN, TRICYCLICSCN   Group B Strep Culture Negative                 Past OB History:       OB History    Para Term  AB Living   1 0 0 0 0 0   SAB TAB Ectopic Molar Multiple Live Births   0 0 0 0 0 0      # Outcome Date GA Lbr Khoa/2nd Weight Sex Delivery Anes PTL Lv   1 Current                Past Medical History: Past Medical History:   Diagnosis Date   • Heartburn       Past  Surgical History Past Surgical History:   Procedure Laterality Date   • WISDOM TOOTH EXTRACTION        Family History: History reviewed. No pertinent family history.   Social History:  reports that she has never smoked. She has never used smokeless tobacco.   reports that she drank alcohol.   reports that she does not use drugs.        REVIEW OF SYSTEMS             Reports fetal movement is normal             Denies leakage of amniotic fluid.             Reports vaginal bleeding, describes as spotting             She reports regular contractions, frequency: Every 4-5 minutes, intensity moderate             All other systems reviewed and are negative    Objective       Vital Signs Range for the last 24 hours  Temperature: Temp:  [98.6 °F (37 °C)] 98.6 °F (37 °C)   Temp Source: Temp src: Oral   BP: BP: (107)/(70) 107/70   Pulse: Heart Rate:  [92] 92   Respirations: Resp:  [14] 14   SPO2:     O2 Amount (l/min):     O2 Devices     Weight: Weight:  [78 kg (172 lb)] 78 kg (172 lb)       Presentation: vertex   Cervix: Exam by:  LOBO Conrad RN   Dilation:  2/70/-2   Effacement:     Station:         Fetal Heart Rate Assessment   Method:  external   Beats/min:     Baseline:  125   Varibility:  moderate   Accels:  present   Decels:  variables   Tracing Category:  2    NST: REACTIVE     Uterine Assessment   Method:  external   Frequency (min):  2-6 minutes   Ctx Count in 10 min:     Duration:  60-90 seconds   Intensity:  moderate   Intensity by IUPC:     Resting Tone:  abd soft by palpation   Resting Tone by IUPC:     [place] Units:             Constitutional:  Well developed, well nourished, no acute distress, well-groomed.   Respiratory:  Resp e/e/u, normal breath sounds.   Cardiovascular:  Normal rate and rhythm, no murmurs.   Gastrointestinal:  Soft, gravid, nontender.  Uterus: Soft, nontender. Fundus appropriate for dates.  Neurologic:  Alert & oriented x 3,  no focal deficits noted.   Psychiatric:  Speech and behavior  appropriate.   Extremities: no cyanosis, clubbing or edema, no evidence of DVT.        Labs:  Lab Results   Component Value Date    WBC 18.93 (H) 03/19/2020    HGB 10.5 (L) 03/19/2020    HCT 31.4 (L) 03/19/2020    MCV 91.8 03/19/2020     03/19/2020    AST 15 06/28/2018    ALT 12 06/28/2018     Results from last 7 days   Lab Units 03/19/20  0532   ABO TYPING  O   RH TYPING  Positive   ANTIBODY SCREEN  Negative           Assessment/Plan       Normal labor        Assessment:  1.  Intrauterine pregnancy at 40w0d weeks gestation with reactive fetal status.    2.  Possible early labor without ROM  3.  Obstetrical history is non-contributory.  4.  GBS status: Negative    Plan:  1. Edwards bulb for labor augmentation  2. Plan of care has been reviewed with patient and questions answered.  3.  Risks, benefits of treatment plan have been discussed.  4.  All questions have been answered.        Sekou Molina CNM  3/19/2020  08:42

## 2020-03-19 NOTE — PROGRESS NOTES
"03/19/20  18:28  Shawnee Walker      ASSESSMENTS: Jaycee is comfortable with her epidural. She is in throne position with  supportive at BS.     /62   Pulse 80   Temp 98.3 °F (36.8 °C) (Oral)   Resp 16   Ht 160 cm (63\")   Wt 78 kg (172 lb)   Breastfeeding No   BMI 30.47 kg/m²     Fetal Heart Rate Assessment   Method: Fetal HR Assessment Method: external, fetal spiral electrode   Beats/min: Fetal HR (beats/min): 120   Baseline: Fetal Heart Baseline Rate: normal range   Varibility: Fetal HR Variability: moderate (amplitude range 6 to 25 bpm)   Accels: Fetal HR Accelerations: greater than/equal to 15 bpm, lasting at least 15 seconds   Decels: Fetal HR Decelerations: variable, prolonged x1   Tracing Category:  2     Uterine Assessment   Method: Method: IUPC (intrauterine pressure catheter)(poor tracing, pt positioned for epidural)   Frequency (min): Contraction Frequency (Minutes): 2-3   Ctx Count in 10 min:     Duration:   seconds   Intensity: Contraction Intensity: mild by palpation   Intensity by IUPC: Contraction Intensity (mm Hg) by IUPC: 80   Resting Tone: Uterine Resting Tone: soft by palpation   Resting Tone by IUPC: Uterine Resting Tone (mmHg) by IUPC: 20   [place] Units:                                Presentation: vertex   Cervix: Exam by: Method: sterile exam per CNM   Dilation: Cervical Dilation (cm): 6-7   Effacement: Cervical Effacement: 100%   Station: Fetal Station: -1            Lab Results   Component Value Date    WBC 18.93 (H) 03/19/2020    HGB 10.5 (L) 03/19/2020    HCT 31.4 (L) 03/19/2020    MCV 91.8 03/19/2020     03/19/2020    AST 15 06/28/2018    ALT 12 06/28/2018     Results from last 7 days   Lab Units 03/19/20  0532   ABO TYPING  O   RH TYPING  Positive   ANTIBODY SCREEN  Negative       PLAN: FSE applied. Oxytocin and amnioinfusion discontinued with little resting tone noted. Will continue to monitor.     Sekou Molina CNM  18:28  03/19/20  "

## 2020-03-19 NOTE — ANESTHESIA PREPROCEDURE EVALUATION
Anesthesia Evaluation     Patient summary reviewed and Nursing notes reviewed   NPO Solid Status: > 6 hours  NPO Liquid Status: < 2 hours           Airway   Mallampati: I  TM distance: >3 FB  Neck ROM: full  Dental      Pulmonary - negative pulmonary ROS   Cardiovascular - negative cardio ROS        Neuro/Psych- negative ROS  GI/Hepatic/Renal/Endo    (+)  GERD,      Musculoskeletal (-) negative ROS    Abdominal    Substance History - negative use     OB/GYN    (+) Pregnant,         Other - negative ROS                     Anesthesia Plan    ASA 2     epidural       Anesthetic plan, all risks, benefits, and alternatives have been provided, discussed and informed consent has been obtained with: patient.

## 2020-03-19 NOTE — PROGRESS NOTES
"03/19/20  12:22  Shawnee CHARLES Denise      ASSESSMENTS: Jaycee has decided she'd like an epidural. She is breathing with contractions with good control currently.     /66   Pulse 81   Temp 99.2 °F (37.3 °C) (Oral)   Resp 16   Ht 160 cm (63\")   Wt 78 kg (172 lb)   Breastfeeding No   BMI 30.47 kg/m²     Fetal Heart Rate Assessment   Method: Fetal HR Assessment Method: (P) external(poor tracing, pt walking)   Beats/min: Fetal HR (beats/min): 125   Baseline: Fetal Heart Baseline Rate: normal range   Varibility: Fetal HR Variability: moderate (amplitude range 6 to 25 bpm)   Accels: Fetal HR Accelerations: present   Decels: Fetal HR Decelerations: early, variable   Tracing Category:  2     Uterine Assessment   Method: Method: IUPC (intrauterine pressure catheter)   Frequency (min): Contraction Frequency (Minutes): 2-5   Ctx Count in 10 min:     Duration:     Intensity: Contraction Intensity: mild by palpation   Intensity by IUPC: Contraction Intensity (mm Hg) by IUPC: 80   Resting Tone: Uterine Resting Tone: soft by palpation   Resting Tone by IUPC: Uterine Resting Tone (mmHg) by IUPC: 20   [place] Units:                                Presentation: vertex   Cervix: Exam by: Method: sterile exam per CNM   Dilation: Cervical Dilation (cm): 4   Effacement: Cervical Effacement: 90%   Station: Fetal Station: -1            Lab Results   Component Value Date    WBC 18.93 (H) 03/19/2020    HGB 10.5 (L) 03/19/2020    HCT 31.4 (L) 03/19/2020    MCV 91.8 03/19/2020     03/19/2020    AST 15 06/28/2018    ALT 12 06/28/2018     Results from last 7 days   Lab Units 03/19/20  0532   ABO TYPING  O   RH TYPING  Positive   ANTIBODY SCREEN  Negative       PLAN: Amnioinfusion infusing. Clear fluid returned. May have epidural. Consider oxytocin augmentation as needed and tolerated by FHR tracing.     Sekou Molina CNM  12:22  03/19/20  "

## 2020-03-20 LAB
BASOPHILS # BLD AUTO: 0.07 10*3/MM3 (ref 0–0.2)
BASOPHILS NFR BLD AUTO: 0.3 % (ref 0–1.5)
DEPRECATED RDW RBC AUTO: 45.4 FL (ref 37–54)
EOSINOPHIL # BLD AUTO: 0.06 10*3/MM3 (ref 0–0.4)
EOSINOPHIL NFR BLD AUTO: 0.3 % (ref 0.3–6.2)
ERYTHROCYTE [DISTWIDTH] IN BLOOD BY AUTOMATED COUNT: 13.1 % (ref 12.3–15.4)
HCT VFR BLD AUTO: 29.2 % (ref 34–46.6)
HGB BLD-MCNC: 9.6 G/DL (ref 12–15.9)
IMM GRANULOCYTES # BLD AUTO: 0.19 10*3/MM3 (ref 0–0.05)
IMM GRANULOCYTES NFR BLD AUTO: 0.8 % (ref 0–0.5)
LYMPHOCYTES # BLD AUTO: 2.11 10*3/MM3 (ref 0.7–3.1)
LYMPHOCYTES NFR BLD AUTO: 9.4 % (ref 19.6–45.3)
MCH RBC QN AUTO: 31.2 PG (ref 26.6–33)
MCHC RBC AUTO-ENTMCNC: 32.9 G/DL (ref 31.5–35.7)
MCV RBC AUTO: 94.8 FL (ref 79–97)
MONOCYTES # BLD AUTO: 1.3 10*3/MM3 (ref 0.1–0.9)
MONOCYTES NFR BLD AUTO: 5.8 % (ref 5–12)
NEUTROPHILS # BLD AUTO: 18.79 10*3/MM3 (ref 1.7–7)
NEUTROPHILS NFR BLD AUTO: 83.4 % (ref 42.7–76)
NRBC BLD AUTO-RTO: 0 /100 WBC (ref 0–0.2)
PLATELET # BLD AUTO: 310 10*3/MM3 (ref 140–450)
PMV BLD AUTO: 10.4 FL (ref 6–12)
RBC # BLD AUTO: 3.08 10*6/MM3 (ref 3.77–5.28)
WBC NRBC COR # BLD: 22.52 10*3/MM3 (ref 3.4–10.8)

## 2020-03-20 PROCEDURE — 0HQ9XZZ REPAIR PERINEUM SKIN, EXTERNAL APPROACH: ICD-10-PCS | Performed by: ADVANCED PRACTICE MIDWIFE

## 2020-03-20 PROCEDURE — 85025 COMPLETE CBC W/AUTO DIFF WBC: CPT | Performed by: ADVANCED PRACTICE MIDWIFE

## 2020-03-20 RX ORDER — FERROUS SULFATE 325(65) MG
325 TABLET ORAL 2 TIMES DAILY WITH MEALS
Status: DISCONTINUED | OUTPATIENT
Start: 2020-03-20 | End: 2020-03-21 | Stop reason: HOSPADM

## 2020-03-20 RX ADMIN — IBUPROFEN 600 MG: 600 TABLET, FILM COATED ORAL at 04:22

## 2020-03-20 RX ADMIN — Medication: at 02:13

## 2020-03-20 RX ADMIN — FERROUS SULFATE TAB 325 MG (65 MG ELEMENTAL FE) 325 MG: 325 (65 FE) TAB at 17:47

## 2020-03-20 RX ADMIN — WITCH HAZEL 1 PAD: 500 SOLUTION RECTAL; TOPICAL at 02:13

## 2020-03-20 RX ADMIN — DOCUSATE SODIUM 100 MG: 100 CAPSULE ORAL at 10:40

## 2020-03-20 RX ADMIN — Medication: at 10:44

## 2020-03-20 RX ADMIN — FERROUS SULFATE TAB 325 MG (65 MG ELEMENTAL FE) 325 MG: 325 (65 FE) TAB at 10:40

## 2020-03-20 RX ADMIN — IBUPROFEN 600 MG: 600 TABLET, FILM COATED ORAL at 10:44

## 2020-03-20 RX ADMIN — PRENATAL VIT W/ FE FUMARATE-FA TAB 27-0.8 MG 1 TABLET: 27-0.8 TAB at 10:40

## 2020-03-20 RX ADMIN — IBUPROFEN 600 MG: 600 TABLET, FILM COATED ORAL at 17:47

## 2020-03-20 NOTE — L&D DELIVERY NOTE
[facility]  Vaginal Delivery Note    Delivery     Delivery: Vaginal, Spontaneous     YOB: 2022    Time of Birth:  Gestational Age 7:40 PM   40w0d     Anesthesia: Epidural     Delivering clinician: Sekou Molina    Forceps?   No   Vacuum? No    Shoulder dystocia present: No        Delivery narrative:  Patient at 40w0d pushed to deliver a viable female infant over a first degree laceration with epidural anesthesia. Infant's head, anterior shoulder, and body delivered atraumatically. Vigorous infant was placed on mom's abdomen where the cord was allowed to stop pulsating and was clamped x2 and cut by FOB. Infant was taken to the warmer for assessment by the awaiting DRT. Placenta delivered spontaneously and appeared to be intact. Perineum was inspected and bilateral labial lacerations were found. Lacerations repaired with 2-0 and 3-0 Vicryl rapide respectively.  ml. Mother and infant stable, bonding.         Infant    Findings: female  infant     Infant observations: Weight: 3175 g (7 lb)   Length: 20  in  Observations/Comments:         Apgars: 8   @ 1 minute /    9   @ 5 minutes   Infant Name: Meredith     Placenta, Cord, and Fluid    Placenta delivered  Spontaneous  at   3/19/2020  7:45 PM     Cord: 3 vessels  present.   Nuchal Cord?  no   Cord blood obtained: Yes    Cord gases obtained:  Yes    Cord gas results: Venous:    pH, Cord Venous   Date Value Ref Range Status   03/19/2020 7.385 pH Units Final       Arterial:    pH, Cord Arterial   Date Value Ref Range Status   03/19/2020 7.35 (H) 7.22 - 7.30 pH Units Final        Repair    Episiotomy: None        Lacerations: Yes  Laceration Information  Laceration Repaired?   Perineal: 1st  Yes    Periurethral:         Labial: bilateral  Yes    Sulcus:         Vaginal:         Cervical:           Suture used for repair: 2-0 and 3-0 Vicryl rapide   Estimated Blood Loss:  250 ml           Complications  meconium    Disposition  Mother to Mother  Baby/Postpartum  in stable condition currently.  Baby to NICU  in stable condition currently.      Sekou Molina CNM  03/19/20  20:17

## 2020-03-20 NOTE — PLAN OF CARE
Problem: Breastfeeding (Pediatric,[place],NICU)  Intervention: Support Exclusive Breastfeeding Success  Flowsheets (Taken 3/20/2020 0915)  Parent/Child Attachment Promotion: caring behavior modeled; cue recognition promoted; face-to-face positioning promoted; positive reinforcement provided; participation in care promoted; rooming-in promoted; skin-to-skin contact encouraged; strengths emphasized     Problem: Breastfeeding (Pediatric,,NICU)  Intervention: Provide Support During Feeding Sessions  Flowsheets (Taken 3/20/2020 0915)  Breastfeeding Support: assisted with latch; assisted with positioning; encouragement provided; feeding on demand promoted; feeding session observed; infant-mother separation minimized; infant stimulated to wakeful state; infant moved to breast; infant latch-on verified; support offered  Note:   Encouraged to bring in breast pump for instruction

## 2020-03-20 NOTE — ANESTHESIA POSTPROCEDURE EVALUATION
Patient: Shawnee Walker    Procedure Summary     Date:  03/19/20 Room / Location:      Anesthesia Start:  1237 Anesthesia Stop:  1945    Procedure:  LABOR ANALGESIA Diagnosis:      Scheduled Providers:   Provider:  Lexx Root DO    Anesthesia Type:  epidural ASA Status:  2          Anesthesia Type: epidural    Vitals  Vitals Value Taken Time   /68 3/20/2020  7:00 AM   Temp 98.1 °F (36.7 °C) 3/20/2020  7:00 AM   Pulse 94 3/20/2020  7:00 AM   Resp 18 3/20/2020  7:00 AM   SpO2             Post Anesthesia Care and Evaluation    Patient location during evaluation: bedside  Patient participation: complete - patient participated  Level of consciousness: awake and alert  Pain management: adequate  Airway patency: patent  Anesthetic complications: No anesthetic complications    Cardiovascular status: acceptable  Respiratory status: acceptable  Hydration status: acceptable  Post Neuraxial Block status: Motor and sensory function returned to baseline and No signs or symptoms of PDPH

## 2020-03-20 NOTE — LACTATION NOTE
03/20/20 0915   Maternal Information   Person Making Referral other (see comments)  (Courtesy visit, new patient)   Maternal Reason for Referral breastfeeding currently;other (see comments)  (Requested help with BFing)   Maternal Assessment   Breast Size Issue none   Breast Shape Bilateral:;round   Breast Density Bilateral:;soft   Nipples Bilateral:;everted   Maternal Infant Feeding   Maternal Emotional State assist needed;relaxed   Infant Positioning clutch/football   Signs of Milk Transfer audible swallow   Pain with Feeding no   Comfort Measures Before/During Feeding infant position adjusted;latch adjusted;suction broken using finger   Comfort Measures Following Feeding air-drying encouraged;other (see comments)  (Lanolin encouraged)   Nipple Shape After Feeding, Right round;symmetrical   Latch Assistance yes   Equipment Type   Breast Pump Type double electric, personal

## 2020-03-21 VITALS
DIASTOLIC BLOOD PRESSURE: 60 MMHG | RESPIRATION RATE: 16 BRPM | SYSTOLIC BLOOD PRESSURE: 119 MMHG | HEART RATE: 70 BPM | HEIGHT: 63 IN | WEIGHT: 172 LBS | BODY MASS INDEX: 30.48 KG/M2 | TEMPERATURE: 98.7 F

## 2020-03-21 PROBLEM — R00.2 PALPITATIONS: Status: RESOLVED | Noted: 2018-07-02 | Resolved: 2020-03-21

## 2020-03-21 RX ADMIN — IBUPROFEN 600 MG: 600 TABLET, FILM COATED ORAL at 01:35

## 2020-03-21 RX ADMIN — DOCUSATE SODIUM 100 MG: 100 CAPSULE ORAL at 01:35

## 2020-03-21 NOTE — DISCHARGE SUMMARY
[facility]  Delivery Discharge Summary    Primary OB Clinician:     EDC: Estimated Date of Delivery: 3/19/20    Gestational Age:40w0d    Date of Admission: 3/19/2020    Admission Diagnosis:      Discharge Diagnosis: Same    Date of Delivery: 3/19/2020   Time of Delivery: 7:40 PM     Delivered By:  Sekou Molina     Delivery Type: Vaginal, Spontaneous          Baby:@BABYNOHDR(SEX)@ infant;   Apgar:  8   @ 1 minute /   Apgar:  9   @ 5 minutes   Weight: @BABYNOHDR(BIRTHWEIGHT)@   Length: @BABYNOHDR(DELRECITEM,HSB,87348,,,,)@    Anesthesia: Epidural      Laceration: Yes  Laceration Information  Laceration Repaired?   Perineal: 1st  Yes    Periurethral:         Labial: bilateral  Yes    Sulcus:         Vaginal:         Cervical:               Exam:  Normal postpartum exam    Hospital Course:  Hospital course has been stable.  Patient is tolerating po, voiding without difficulty and ready for discharge.  Please see medication reconciliation and lab report for additional details.      Follow Up:  Patient has been given a follow up appointment in our office in 6 weeks. Postpartum depression, mastitis, and lochia warning signs reviewed. Patient states she has ibuprofen, iron supplement, and prenatal vitamins at home. Pelvic rest for 6 weeks.   Discharge Date: 3/21/2020; Discharge Time: 11:58

## 2020-03-21 NOTE — PROGRESS NOTES
[facility]  Vaginal Delivery Progress Note    Subjective     Doing well, pain controlled, lochia less than menses      Objective     Vital Signs Range for the last 24 hours  Temperature: Temp:  [98.3 °F (36.8 °C)-98.7 °F (37.1 °C)] 98.7 °F (37.1 °C)   Temp Source: Temp src: Oral   BP: BP: (107-119)/(60-66) 119/60   Pulse: Heart Rate:  [70-82] 70   Respirations: Resp:  [16-18] 16   SPO2:     O2 Amount (l/min):     O2 Devices           Physical Exam:  General:  no acute distresss.  Abdomen: Soft, non-tender, fundus firm  Extremities: normal, atraumatic, no cyanosis, and trace edema.       Lab results reviewed:  Yes    Lab Results   Component Value Date    WBC 22.52 (H) 2020    HGB 9.6 (L) 2020    HCT 29.2 (L) 2020    MCV 94.8 2020     2020         Assessment/Plan        (normal spontaneous vaginal delivery)      Shawnee Walker is Day 2  post-partum       Plan:  Discharge home with standard precautions and return to clinic in 4-6 weeks.      Sari Toure CNM  3/21/2020  11:57

## 2020-03-25 LAB — POC AMNISURE: NEGATIVE

## 2020-03-25 PROCEDURE — 84112 EVAL AMNIOTIC FLUID PROTEIN: CPT | Performed by: ADVANCED PRACTICE MIDWIFE

## 2022-03-04 ENCOUNTER — TRANSCRIBE ORDERS (OUTPATIENT)
Dept: LAB | Facility: HOSPITAL | Age: 32
End: 2022-03-04

## 2022-03-04 ENCOUNTER — LAB (OUTPATIENT)
Dept: LAB | Facility: HOSPITAL | Age: 32
End: 2022-03-04

## 2022-03-04 DIAGNOSIS — Z34.81 PRENATAL CARE, SUBSEQUENT PREGNANCY, FIRST TRIMESTER: Primary | ICD-10-CM

## 2022-03-04 DIAGNOSIS — Z34.81 PRENATAL CARE, SUBSEQUENT PREGNANCY, FIRST TRIMESTER: ICD-10-CM

## 2022-03-04 LAB
ABO GROUP BLD: NORMAL
AMPHET+METHAMPHET UR QL: NEGATIVE
AMPHETAMINES UR QL: NEGATIVE
BARBITURATES UR QL SCN: NEGATIVE
BASOPHILS # BLD AUTO: 0.04 10*3/MM3 (ref 0–0.2)
BASOPHILS NFR BLD AUTO: 0.5 % (ref 0–1.5)
BENZODIAZ UR QL SCN: NEGATIVE
BILIRUB UR QL STRIP: NEGATIVE
BLD GP AB SCN SERPL QL: NEGATIVE
BUPRENORPHINE SERPL-MCNC: NEGATIVE NG/ML
CANNABINOIDS SERPL QL: NEGATIVE
CLARITY UR: CLEAR
COCAINE UR QL: NEGATIVE
COLOR UR: YELLOW
DEPRECATED RDW RBC AUTO: 41 FL (ref 37–54)
EOSINOPHIL # BLD AUTO: 0.07 10*3/MM3 (ref 0–0.4)
EOSINOPHIL NFR BLD AUTO: 0.9 % (ref 0.3–6.2)
ERYTHROCYTE [DISTWIDTH] IN BLOOD BY AUTOMATED COUNT: 12.2 % (ref 12.3–15.4)
GLUCOSE SERPL-MCNC: 67 MG/DL (ref 65–99)
GLUCOSE UR STRIP-MCNC: NEGATIVE MG/DL
HBV SURFACE AG SERPL QL IA: NORMAL
HCT VFR BLD AUTO: 39 % (ref 34–46.6)
HCV AB SER DONR QL: NORMAL
HGB BLD-MCNC: 13.2 G/DL (ref 12–15.9)
HGB UR QL STRIP.AUTO: NEGATIVE
IMM GRANULOCYTES # BLD AUTO: 0.02 10*3/MM3 (ref 0–0.05)
IMM GRANULOCYTES NFR BLD AUTO: 0.3 % (ref 0–0.5)
KETONES UR QL STRIP: NEGATIVE
LEUKOCYTE ESTERASE UR QL STRIP.AUTO: NEGATIVE
LYMPHOCYTES # BLD AUTO: 1.77 10*3/MM3 (ref 0.7–3.1)
LYMPHOCYTES NFR BLD AUTO: 22.3 % (ref 19.6–45.3)
MCH RBC QN AUTO: 31.4 PG (ref 26.6–33)
MCHC RBC AUTO-ENTMCNC: 33.8 G/DL (ref 31.5–35.7)
MCV RBC AUTO: 92.6 FL (ref 79–97)
METHADONE UR QL SCN: NEGATIVE
MONOCYTES # BLD AUTO: 0.35 10*3/MM3 (ref 0.1–0.9)
MONOCYTES NFR BLD AUTO: 4.4 % (ref 5–12)
NEUTROPHILS NFR BLD AUTO: 5.67 10*3/MM3 (ref 1.7–7)
NEUTROPHILS NFR BLD AUTO: 71.6 % (ref 42.7–76)
NITRITE UR QL STRIP: NEGATIVE
NRBC BLD AUTO-RTO: 0 /100 WBC (ref 0–0.2)
OPIATES UR QL: NEGATIVE
OXYCODONE UR QL SCN: NEGATIVE
PCP UR QL SCN: NEGATIVE
PH UR STRIP.AUTO: 8 [PH] (ref 5–8)
PLATELET # BLD AUTO: 310 10*3/MM3 (ref 140–450)
PMV BLD AUTO: 10.2 FL (ref 6–12)
PROPOXYPH UR QL: NEGATIVE
PROT UR QL STRIP: NEGATIVE
RBC # BLD AUTO: 4.21 10*6/MM3 (ref 3.77–5.28)
RH BLD: POSITIVE
SP GR UR STRIP: 1.01 (ref 1–1.03)
T4 FREE SERPL-MCNC: 1.47 NG/DL (ref 0.93–1.7)
TRICYCLICS UR QL SCN: NEGATIVE
TSH SERPL DL<=0.05 MIU/L-ACNC: 0.11 UIU/ML (ref 0.27–4.2)
UROBILINOGEN UR QL STRIP: NORMAL
WBC NRBC COR # BLD: 7.92 10*3/MM3 (ref 3.4–10.8)

## 2022-03-04 PROCEDURE — 86762 RUBELLA ANTIBODY: CPT

## 2022-03-04 PROCEDURE — 86850 RBC ANTIBODY SCREEN: CPT

## 2022-03-04 PROCEDURE — 84443 ASSAY THYROID STIM HORMONE: CPT

## 2022-03-04 PROCEDURE — 86803 HEPATITIS C AB TEST: CPT

## 2022-03-04 PROCEDURE — 36415 COLL VENOUS BLD VENIPUNCTURE: CPT

## 2022-03-04 PROCEDURE — 86900 BLOOD TYPING SEROLOGIC ABO: CPT

## 2022-03-04 PROCEDURE — 86901 BLOOD TYPING SEROLOGIC RH(D): CPT

## 2022-03-04 PROCEDURE — 80306 DRUG TEST PRSMV INSTRMNT: CPT

## 2022-03-04 PROCEDURE — 82947 ASSAY GLUCOSE BLOOD QUANT: CPT

## 2022-03-04 PROCEDURE — 81003 URINALYSIS AUTO W/O SCOPE: CPT

## 2022-03-04 PROCEDURE — 84439 ASSAY OF FREE THYROXINE: CPT

## 2022-03-05 LAB
RPR SER QL: NORMAL
RUBV IGG SERPL IA-ACNC: 5.91 INDEX

## 2022-05-11 ENCOUNTER — HOSPITAL ENCOUNTER (OUTPATIENT)
Facility: HOSPITAL | Age: 32
Setting detail: OBSERVATION
Discharge: HOME | End: 2022-05-11
Attending: OBSTETRICS & GYNECOLOGY | Admitting: OBSTETRICS & GYNECOLOGY

## 2022-05-11 ENCOUNTER — HOSPITAL ENCOUNTER (OUTPATIENT)
Facility: HOSPITAL | Age: 32
End: 2022-05-11
Attending: OBSTETRICS & GYNECOLOGY | Admitting: OBSTETRICS & GYNECOLOGY

## 2022-05-11 VITALS
HEIGHT: 63 IN | DIASTOLIC BLOOD PRESSURE: 70 MMHG | RESPIRATION RATE: 18 BRPM | HEART RATE: 96 BPM | WEIGHT: 156 LBS | TEMPERATURE: 99.2 F | SYSTOLIC BLOOD PRESSURE: 115 MMHG | BODY MASS INDEX: 27.64 KG/M2

## 2022-05-11 PROBLEM — Z34.90 PREGNANCY: Status: ACTIVE | Noted: 2022-05-11

## 2022-05-11 LAB
BILIRUB UR QL STRIP: NEGATIVE
CLARITY UR: CLEAR
COLOR UR: YELLOW
GLUCOSE UR STRIP-MCNC: NEGATIVE MG/DL
HGB UR QL STRIP.AUTO: NEGATIVE
KETONES UR QL STRIP: NEGATIVE
LEUKOCYTE ESTERASE UR QL STRIP.AUTO: NEGATIVE
NITRITE UR QL STRIP: NEGATIVE
PH UR STRIP.AUTO: 6.5 [PH] (ref 5–8)
PROT UR QL STRIP: NEGATIVE
SP GR UR STRIP: 1.01 (ref 1–1.03)
UROBILINOGEN UR QL STRIP: NORMAL

## 2022-05-11 PROCEDURE — 81003 URINALYSIS AUTO W/O SCOPE: CPT | Performed by: OBSTETRICS & GYNECOLOGY

## 2022-05-11 PROCEDURE — 99218 PR INITIAL OBSERVATION CARE/DAY 30 MINUTES: CPT | Performed by: OBSTETRICS & GYNECOLOGY

## 2022-05-11 PROCEDURE — G0378 HOSPITAL OBSERVATION PER HR: HCPCS

## 2022-05-11 RX ORDER — DOCUSATE SODIUM 100 MG/1
100 CAPSULE, LIQUID FILLED ORAL 2 TIMES DAILY PRN
COMMUNITY

## 2022-05-11 RX ORDER — ESOMEPRAZOLE MAGNESIUM 40 MG/1
40 CAPSULE, DELAYED RELEASE ORAL
COMMUNITY

## 2022-05-11 RX ORDER — PRENATAL WITH FERROUS FUM AND FOLIC ACID 3080; 920; 120; 400; 22; 1.84; 3; 20; 10; 1; 12; 200; 27; 25; 2 [IU]/1; [IU]/1; MG/1; [IU]/1; MG/1; MG/1; MG/1; MG/1; MG/1; MG/1; UG/1; MG/1; MG/1; MG/1; MG/1
TABLET ORAL DAILY
COMMUNITY

## 2022-05-11 NOTE — H&P
[facility]  Obstetric History and Physical    Referring Provider: Bernie Freire MD      Chief Complaint   Patient presents with   • Contractions     Started occasionally during night. More frequent since waking up.       Subjective     Patient is a 32 y.o. female  currently at 20w5d, who presents with complaint of contractions.  Patient complained of mild irregular contractions throughout the night 5 to 20 minutes without associated leaking of fluid, vaginal bleeding, recent trauma.  Patient denies fever, urinary symptoms, nausea, vomiting, short of breath, chest pain.  Prenatal care has been uneventful to date.  Obstetrical history significant previous term vaginal delivery..        The following portions of the patients history were reviewed and updated as appropriate: current medications, allergies, past medical history, past surgical history, past family history, past social history and problem list .       Prenatal Information:   Maternal Prenatal Labs  Blood Type No results found for: ABO   Rh Status No results found for: RH   Antibody Screen No results found for: ABSCRN   Gonnorhea No results found for: GCCX   Chlamydia No results found for: CLAMYDCU   RPR No results found for: RPR   Syphilis Antibody No results found for: SYPHILIS   Rubella No results found for: RUBELLAIGGIN   Hepatitis B Surface Antigen No results found for: HEPBSAG   HIV-1 Antibody No results found for: LABHIV1   Hepatitis C Antibody No results found for: HEPCAB   Rapid Urin Drug Screen No results found for: AMPMETHU, BARBITSCNUR, LABBENZSCN, LABMETHSCN, LABOPIASCN, THCURSCR, COCAINEUR, AMPHETSCREEN, PROPOXSCN, BUPRENORSCNU, METAMPSCNUR, OXYCODONESCN, TRICYCLICSCN   Group B Strep Culture No results found for: GBSANTIGEN           External Prenatal Results     Pregnancy Outside Results - Transcribed From Office Records - See Scanned Records For Details     Test Value Date Time    ABO  O  22 0834    Rh  Positive  22  0834    Antibody Screen  Negative  22 0834    Varicella IgG       Rubella  5.91 index 22 0834    Hgb  13.2 g/dL 22 0834    Hct  39.0 % 22 0834    Glucose Fasting GTT       Glucose Tolerance Test 1 hour       Glucose Tolerance Test 3 hour       Gonorrhea (discrete)       Chlamydia (discrete)       RPR  Non-Reactive  22 0834    VDRL       Syphilis Antibody       HBsAg  Non-Reactive  22 0834    Herpes Simplex Virus PCR       Herpes Simplex VIrus Culture       HIV  Non-Reactive  19 1232    Hep C RNA Quant PCR       Hep C Antibody  Non-Reactive  22 0834    AFP       Group B Strep       GBS Susceptibility to Clindamycin       GBS Susceptibility to Erythromycin       Fetal Fibronectin       Genetic Testing, Maternal Blood             Drug Screening     Test Value Date Time    Urine Drug Screen       Amphetamine Screen  Negative  22 0834    Barbiturate Screen  Negative  22 0834    Benzodiazepine Screen  Negative  22 0834    Methadone Screen  Negative  22 0834    Phencyclidine Screen  Negative  22 0834    Opiates Screen  Negative  22 0834    THC Screen  Negative  22 0834    Cocaine Screen       Propoxyphene Screen  Negative  22 0834    Buprenorphine Screen  Negative  22 0834    Methamphetamine Screen       Oxycodone Screen  Negative  22 0834    Tricyclic Antidepressants Screen  Negative  22 0834          Legend    ^: Historical                          Past OB History:       OB History    Para Term  AB Living   2 1 1 0 0 1   SAB IAB Ectopic Molar Multiple Live Births   0 0 0 0 0 1      # Outcome Date GA Lbr Khoa/2nd Weight Sex Delivery Anes PTL Lv   2 Current            1 Term 20 40w0d 17:46 / 00:24 3175 g (7 lb) F Vag-Spont EPI N LULÚ      Name: HEATHERLETICIA      Apgar1: 8  Apgar5: 9       Past Medical History: Past Medical History:   Diagnosis Date   • Heartburn    • PAC  (premature atrial contraction)     has had echo in past, no current treatments.      Past Surgical History Past Surgical History:   Procedure Laterality Date   • WISDOM TOOTH EXTRACTION        Family History: No family history on file.   Social History:  reports that she has never smoked. She has never used smokeless tobacco.   reports previous alcohol use.   reports no history of drug use.                   General ROS Negative Findings:Headaches, Visual Changes, Epigastric pain, Anorexia, Nausia/Vomiting, ROM and Vaginal Bleeding    ROS     All other systems have been reviewed and are neg  Objective       Vital Signs Range for the last 24 hours  Temperature: Temp:  [99.2 °F (37.3 °C)] 99.2 °F (37.3 °C)   Temp Source: Temp src: Oral   BP: BP: (115)/(70) 115/70   Pulse: Heart Rate:  [96] 96   Respirations: Resp:  [18] 18   SPO2:     O2 Amount (l/min):     O2 Devices     Weight: Weight:  [70.8 kg (156 lb)] 70.8 kg (156 lb)     Physical Examination:   General:   alert, appears stated age and cooperative   Skin:   normal   HEENT:  Sclera clear   Lungs:      Heart:      Gastrointestinal:  Abdomen soft, gravid uterus nontender, guarding benign exam   Lower Extremities:  No edema, no calf tenderness   : External genitalia: normal general appearance  Uterus: enlarged   Musculoskeletal:     Neuro:  No focal deficits, DTR 2+4 no clonus.         Presentation: vtx   Cervix: Exam by: Method: sterile exam per physician   Dilation:  Closed   Effacement: Cervical Effacement: 0%   Station:  Not engaged  No blood noted on glove.       Fetal Heart Rate Assessment   Method:     Beats/min:     Baseline:     Varibility:     Accels:     Decels:     Tracing Category:       Uterine Assessment   Method:     Frequency (min):     Ctx Count in 10 min:     Duration:     Intensity:     Intensity by IUPC:     Resting Tone:     Resting Tone by IUPC:     [place] Units:       Laboratory Results:   Lab Results (last 24 hours)     Procedure  Component Value Units Date/Time    Urinalysis With Microscopic If Indicated (No Culture) - Urine, Clean Catch [[id number]] Collected: 22    Specimen: Urine, Clean Catch Updated: 22        Radiology Review:   Imaging Results (Last 24 Hours)     ** No results found for the last 24 hours. **        Other Studies: Bedside ultrasound single IUP vertex presentation, fetus active, anterior placenta without evidence of retroplacental clot.  Transvaginal ultrasound cervical length average 3.8 cm without funneling.    Assessment & Plan       Pregnancy        Assessment:  1.  Intrauterine pregnancy at 20w5d weeks gestation with reassuring fetal status.    2.  No signs of labor   3.  Discomforts of pregnancy  4.      Plan:  1.  Discharge home,  instructions given, discussed with patient on proper nutrition, hydration, activity.  Follow-up with OB provider routinely.  2. Plan of care has been reviewed with patient.  3.  Risks, benefits of treatment plan have been discussed.  4.  All questions have been answered.  5      Miguel A Solorio DO  2022  09:32 EDT

## 2022-08-30 LAB — EXTERNAL GROUP B STREP ANTIGEN: NEGATIVE

## 2022-09-12 ENCOUNTER — PREP FOR SURGERY (OUTPATIENT)
Dept: OTHER | Facility: HOSPITAL | Age: 32
End: 2022-09-12

## 2022-09-12 DIAGNOSIS — Z3A.39 39 WEEKS GESTATION OF PREGNANCY: Primary | ICD-10-CM

## 2022-09-12 RX ORDER — ACETAMINOPHEN 325 MG/1
650 TABLET ORAL EVERY 4 HOURS PRN
Status: CANCELLED | OUTPATIENT
Start: 2022-09-12

## 2022-09-12 RX ORDER — TERBUTALINE SULFATE 1 MG/ML
0.25 INJECTION, SOLUTION SUBCUTANEOUS AS NEEDED
Status: CANCELLED | OUTPATIENT
Start: 2022-09-12

## 2022-09-12 RX ORDER — METHYLERGONOVINE MALEATE 0.2 MG/ML
200 INJECTION INTRAVENOUS ONCE AS NEEDED
Status: CANCELLED | OUTPATIENT
Start: 2022-09-12

## 2022-09-12 RX ORDER — SODIUM CHLORIDE 0.9 % (FLUSH) 0.9 %
3 SYRINGE (ML) INJECTION EVERY 12 HOURS SCHEDULED
Status: CANCELLED | OUTPATIENT
Start: 2022-09-12

## 2022-09-12 RX ORDER — MISOPROSTOL 200 UG/1
800 TABLET ORAL AS NEEDED
Status: CANCELLED | OUTPATIENT
Start: 2022-09-12

## 2022-09-12 RX ORDER — ONDANSETRON 2 MG/ML
4 INJECTION INTRAMUSCULAR; INTRAVENOUS EVERY 6 HOURS PRN
Status: CANCELLED | OUTPATIENT
Start: 2022-09-12

## 2022-09-12 RX ORDER — PROMETHAZINE HYDROCHLORIDE 12.5 MG/1
12.5 TABLET ORAL EVERY 6 HOURS PRN
Status: CANCELLED | OUTPATIENT
Start: 2022-09-12

## 2022-09-12 RX ORDER — OXYTOCIN/0.9 % SODIUM CHLORIDE 30/500 ML
85 PLASTIC BAG, INJECTION (ML) INTRAVENOUS ONCE
Status: CANCELLED | OUTPATIENT
Start: 2022-09-12

## 2022-09-12 RX ORDER — CARBOPROST TROMETHAMINE 250 UG/ML
250 INJECTION, SOLUTION INTRAMUSCULAR AS NEEDED
Status: CANCELLED | OUTPATIENT
Start: 2022-09-12

## 2022-09-12 RX ORDER — LIDOCAINE HYDROCHLORIDE 10 MG/ML
5 INJECTION, SOLUTION EPIDURAL; INFILTRATION; INTRACAUDAL; PERINEURAL AS NEEDED
Status: CANCELLED | OUTPATIENT
Start: 2022-09-12

## 2022-09-12 RX ORDER — SODIUM CHLORIDE, SODIUM LACTATE, POTASSIUM CHLORIDE, CALCIUM CHLORIDE 600; 310; 30; 20 MG/100ML; MG/100ML; MG/100ML; MG/100ML
125 INJECTION, SOLUTION INTRAVENOUS CONTINUOUS
Status: CANCELLED | OUTPATIENT
Start: 2022-09-12

## 2022-09-12 RX ORDER — ONDANSETRON 4 MG/1
4 TABLET, FILM COATED ORAL EVERY 6 HOURS PRN
Status: CANCELLED | OUTPATIENT
Start: 2022-09-12

## 2022-09-12 RX ORDER — SODIUM CHLORIDE 0.9 % (FLUSH) 0.9 %
3-10 SYRINGE (ML) INJECTION AS NEEDED
Status: CANCELLED | OUTPATIENT
Start: 2022-09-12

## 2022-09-12 RX ORDER — PROMETHAZINE HYDROCHLORIDE 12.5 MG/1
12.5 SUPPOSITORY RECTAL EVERY 6 HOURS PRN
Status: CANCELLED | OUTPATIENT
Start: 2022-09-12

## 2022-09-12 RX ORDER — OXYTOCIN/0.9 % SODIUM CHLORIDE 30/500 ML
2-20 PLASTIC BAG, INJECTION (ML) INTRAVENOUS
Status: CANCELLED | OUTPATIENT
Start: 2022-09-12

## 2022-09-12 RX ORDER — BUTORPHANOL TARTRATE 1 MG/ML
1 INJECTION, SOLUTION INTRAMUSCULAR; INTRAVENOUS
Status: CANCELLED | OUTPATIENT
Start: 2022-09-12

## 2022-09-12 RX ORDER — OXYTOCIN/0.9 % SODIUM CHLORIDE 30/500 ML
650 PLASTIC BAG, INJECTION (ML) INTRAVENOUS ONCE
Status: CANCELLED | OUTPATIENT
Start: 2022-09-12

## 2022-09-16 ENCOUNTER — HOSPITAL ENCOUNTER (OUTPATIENT)
Dept: LABOR AND DELIVERY | Facility: HOSPITAL | Age: 32
Discharge: HOME | End: 2022-09-16

## 2022-09-16 ENCOUNTER — HOSPITAL ENCOUNTER (INPATIENT)
Facility: HOSPITAL | Age: 32
LOS: 2 days | Discharge: HOME | End: 2022-09-18
Attending: OBSTETRICS & GYNECOLOGY | Admitting: OBSTETRICS & GYNECOLOGY

## 2022-09-16 DIAGNOSIS — Z3A.39 39 WEEKS GESTATION OF PREGNANCY: ICD-10-CM

## 2022-09-16 PROBLEM — Z34.90 TERM PREGNANCY: Status: ACTIVE | Noted: 2022-09-16

## 2022-09-16 LAB
ABO GROUP BLD: NORMAL
BLD GP AB SCN SERPL QL: NEGATIVE
DEPRECATED RDW RBC AUTO: 41.1 FL (ref 37–54)
ERYTHROCYTE [DISTWIDTH] IN BLOOD BY AUTOMATED COUNT: 13.2 % (ref 12.3–15.4)
HCT VFR BLD AUTO: 27 % (ref 34–46.6)
HGB BLD-MCNC: 8.4 G/DL (ref 12–15.9)
MCH RBC QN AUTO: 26.7 PG (ref 26.6–33)
MCHC RBC AUTO-ENTMCNC: 31.1 G/DL (ref 31.5–35.7)
MCV RBC AUTO: 85.7 FL (ref 79–97)
PLATELET # BLD AUTO: 493 10*3/MM3 (ref 140–450)
PMV BLD AUTO: 10.6 FL (ref 6–12)
RBC # BLD AUTO: 3.15 10*6/MM3 (ref 3.77–5.28)
RH BLD: POSITIVE
T&S EXPIRATION DATE: NORMAL
WBC NRBC COR # BLD: 12.92 10*3/MM3 (ref 3.4–10.8)

## 2022-09-16 PROCEDURE — 86900 BLOOD TYPING SEROLOGIC ABO: CPT | Performed by: OBSTETRICS & GYNECOLOGY

## 2022-09-16 PROCEDURE — 25010000002 OXYTOCIN PER 10 UNITS: Performed by: OBSTETRICS & GYNECOLOGY

## 2022-09-16 PROCEDURE — 86850 RBC ANTIBODY SCREEN: CPT | Performed by: OBSTETRICS & GYNECOLOGY

## 2022-09-16 PROCEDURE — 10907ZC DRAINAGE OF AMNIOTIC FLUID, THERAPEUTIC FROM PRODUCTS OF CONCEPTION, VIA NATURAL OR ARTIFICIAL OPENING: ICD-10-PCS | Performed by: OBSTETRICS & GYNECOLOGY

## 2022-09-16 PROCEDURE — 3E033VJ INTRODUCTION OF OTHER HORMONE INTO PERIPHERAL VEIN, PERCUTANEOUS APPROACH: ICD-10-PCS | Performed by: OBSTETRICS & GYNECOLOGY

## 2022-09-16 PROCEDURE — 59025 FETAL NON-STRESS TEST: CPT

## 2022-09-16 PROCEDURE — 86901 BLOOD TYPING SEROLOGIC RH(D): CPT | Performed by: OBSTETRICS & GYNECOLOGY

## 2022-09-16 PROCEDURE — 85027 COMPLETE CBC AUTOMATED: CPT | Performed by: OBSTETRICS & GYNECOLOGY

## 2022-09-16 RX ORDER — DIPHENHYDRAMINE HCL 25 MG
25 CAPSULE ORAL NIGHTLY PRN
Status: DISCONTINUED | OUTPATIENT
Start: 2022-09-16 | End: 2022-09-18 | Stop reason: HOSPADM

## 2022-09-16 RX ORDER — PROMETHAZINE HYDROCHLORIDE 12.5 MG/1
12.5 TABLET ORAL EVERY 6 HOURS PRN
Status: DISCONTINUED | OUTPATIENT
Start: 2022-09-16 | End: 2022-09-16 | Stop reason: HOSPADM

## 2022-09-16 RX ORDER — MISOPROSTOL 200 UG/1
800 TABLET ORAL AS NEEDED
Status: DISCONTINUED | OUTPATIENT
Start: 2022-09-16 | End: 2022-09-18 | Stop reason: HOSPADM

## 2022-09-16 RX ORDER — OXYTOCIN/0.9 % SODIUM CHLORIDE 30/500 ML
650 PLASTIC BAG, INJECTION (ML) INTRAVENOUS ONCE
Status: DISCONTINUED | OUTPATIENT
Start: 2022-09-16 | End: 2022-09-16 | Stop reason: HOSPADM

## 2022-09-16 RX ORDER — ONDANSETRON 2 MG/ML
4 INJECTION INTRAMUSCULAR; INTRAVENOUS EVERY 6 HOURS PRN
Status: DISCONTINUED | OUTPATIENT
Start: 2022-09-16 | End: 2022-09-16 | Stop reason: HOSPADM

## 2022-09-16 RX ORDER — TERBUTALINE SULFATE 1 MG/ML
0.25 INJECTION, SOLUTION SUBCUTANEOUS AS NEEDED
Status: DISCONTINUED | OUTPATIENT
Start: 2022-09-16 | End: 2022-09-16

## 2022-09-16 RX ORDER — CALCIUM CARBONATE 200(500)MG
1 TABLET,CHEWABLE ORAL 3 TIMES DAILY PRN
Status: DISCONTINUED | OUTPATIENT
Start: 2022-09-16 | End: 2022-09-18 | Stop reason: HOSPADM

## 2022-09-16 RX ORDER — LIDOCAINE HYDROCHLORIDE 10 MG/ML
INJECTION, SOLUTION EPIDURAL; INFILTRATION; INTRACAUDAL; PERINEURAL
Status: DISCONTINUED
Start: 2022-09-16 | End: 2022-09-16 | Stop reason: WASHOUT

## 2022-09-16 RX ORDER — SODIUM CHLORIDE 0.9 % (FLUSH) 0.9 %
3 SYRINGE (ML) INJECTION EVERY 12 HOURS SCHEDULED
Status: DISCONTINUED | OUTPATIENT
Start: 2022-09-16 | End: 2022-09-16

## 2022-09-16 RX ORDER — PROMETHAZINE HYDROCHLORIDE 12.5 MG/1
12.5 SUPPOSITORY RECTAL EVERY 6 HOURS PRN
Status: DISCONTINUED | OUTPATIENT
Start: 2022-09-16 | End: 2022-09-16 | Stop reason: HOSPADM

## 2022-09-16 RX ORDER — ONDANSETRON 2 MG/ML
4 INJECTION INTRAMUSCULAR; INTRAVENOUS EVERY 6 HOURS PRN
Status: DISCONTINUED | OUTPATIENT
Start: 2022-09-16 | End: 2022-09-18 | Stop reason: HOSPADM

## 2022-09-16 RX ORDER — MISOPROSTOL 200 UG/1
800 TABLET ORAL AS NEEDED
Status: DISCONTINUED | OUTPATIENT
Start: 2022-09-16 | End: 2022-09-16 | Stop reason: HOSPADM

## 2022-09-16 RX ORDER — IBUPROFEN 600 MG/1
600 TABLET ORAL EVERY 6 HOURS PRN
Status: DISCONTINUED | OUTPATIENT
Start: 2022-09-16 | End: 2022-09-18 | Stop reason: HOSPADM

## 2022-09-16 RX ORDER — PROMETHAZINE HYDROCHLORIDE 12.5 MG/1
12.5 TABLET ORAL EVERY 4 HOURS PRN
Status: DISCONTINUED | OUTPATIENT
Start: 2022-09-16 | End: 2022-09-18 | Stop reason: HOSPADM

## 2022-09-16 RX ORDER — PRENATAL VIT/IRON FUM/FOLIC AC 27MG-0.8MG
1 TABLET ORAL DAILY
Status: DISCONTINUED | OUTPATIENT
Start: 2022-09-16 | End: 2022-09-18 | Stop reason: HOSPADM

## 2022-09-16 RX ORDER — BUTORPHANOL TARTRATE 1 MG/ML
1 INJECTION, SOLUTION INTRAMUSCULAR; INTRAVENOUS
Status: DISCONTINUED | OUTPATIENT
Start: 2022-09-16 | End: 2022-09-16

## 2022-09-16 RX ORDER — METHYLERGONOVINE MALEATE 0.2 MG/ML
200 INJECTION INTRAVENOUS ONCE AS NEEDED
Status: DISCONTINUED | OUTPATIENT
Start: 2022-09-16 | End: 2022-09-16 | Stop reason: HOSPADM

## 2022-09-16 RX ORDER — SIMETHICONE 80 MG
80 TABLET,CHEWABLE ORAL 4 TIMES DAILY
Status: DISCONTINUED | OUTPATIENT
Start: 2022-09-16 | End: 2022-09-18 | Stop reason: HOSPADM

## 2022-09-16 RX ORDER — METHYLERGONOVINE MALEATE 0.2 MG/ML
200 INJECTION INTRAVENOUS ONCE AS NEEDED
Status: DISCONTINUED | OUTPATIENT
Start: 2022-09-16 | End: 2022-09-18 | Stop reason: HOSPADM

## 2022-09-16 RX ORDER — ONDANSETRON 4 MG/1
4 TABLET, FILM COATED ORAL EVERY 6 HOURS PRN
Status: DISCONTINUED | OUTPATIENT
Start: 2022-09-16 | End: 2022-09-16 | Stop reason: HOSPADM

## 2022-09-16 RX ORDER — SODIUM CHLORIDE 0.9 % (FLUSH) 0.9 %
1-10 SYRINGE (ML) INJECTION AS NEEDED
Status: DISCONTINUED | OUTPATIENT
Start: 2022-09-16 | End: 2022-09-18 | Stop reason: HOSPADM

## 2022-09-16 RX ORDER — CARBOPROST TROMETHAMINE 250 UG/ML
250 INJECTION, SOLUTION INTRAMUSCULAR AS NEEDED
Status: DISCONTINUED | OUTPATIENT
Start: 2022-09-16 | End: 2022-09-16 | Stop reason: HOSPADM

## 2022-09-16 RX ORDER — OXYTOCIN/0.9 % SODIUM CHLORIDE 30/500 ML
2-20 PLASTIC BAG, INJECTION (ML) INTRAVENOUS
Status: DISCONTINUED | OUTPATIENT
Start: 2022-09-16 | End: 2022-09-16

## 2022-09-16 RX ORDER — OXYCODONE HYDROCHLORIDE AND ACETAMINOPHEN 5; 325 MG/1; MG/1
1 TABLET ORAL EVERY 4 HOURS PRN
Status: DISCONTINUED | OUTPATIENT
Start: 2022-09-16 | End: 2022-09-18 | Stop reason: HOSPADM

## 2022-09-16 RX ORDER — SODIUM CHLORIDE, SODIUM LACTATE, POTASSIUM CHLORIDE, CALCIUM CHLORIDE 600; 310; 30; 20 MG/100ML; MG/100ML; MG/100ML; MG/100ML
125 INJECTION, SOLUTION INTRAVENOUS CONTINUOUS
Status: DISCONTINUED | OUTPATIENT
Start: 2022-09-16 | End: 2022-09-16

## 2022-09-16 RX ORDER — HYDROCORTISONE 25 MG/G
1 CREAM TOPICAL AS NEEDED
Status: DISCONTINUED | OUTPATIENT
Start: 2022-09-16 | End: 2022-09-18 | Stop reason: HOSPADM

## 2022-09-16 RX ORDER — OXYTOCIN/0.9 % SODIUM CHLORIDE 30/500 ML
85 PLASTIC BAG, INJECTION (ML) INTRAVENOUS ONCE
Status: COMPLETED | OUTPATIENT
Start: 2022-09-16 | End: 2022-09-16

## 2022-09-16 RX ORDER — LIDOCAINE HYDROCHLORIDE 10 MG/ML
5 INJECTION, SOLUTION EPIDURAL; INFILTRATION; INTRACAUDAL; PERINEURAL AS NEEDED
Status: DISCONTINUED | OUTPATIENT
Start: 2022-09-16 | End: 2022-09-16

## 2022-09-16 RX ORDER — ONDANSETRON 4 MG/1
4 TABLET, FILM COATED ORAL EVERY 8 HOURS PRN
Status: DISCONTINUED | OUTPATIENT
Start: 2022-09-16 | End: 2022-09-18 | Stop reason: HOSPADM

## 2022-09-16 RX ORDER — MAGNESIUM CARB/ALUMINUM HYDROX 105-160MG
TABLET,CHEWABLE ORAL
Status: COMPLETED
Start: 2022-09-16 | End: 2022-09-16

## 2022-09-16 RX ORDER — DOCUSATE SODIUM 100 MG/1
100 CAPSULE, LIQUID FILLED ORAL 2 TIMES DAILY
Status: DISCONTINUED | OUTPATIENT
Start: 2022-09-16 | End: 2022-09-18 | Stop reason: HOSPADM

## 2022-09-16 RX ORDER — CARBOPROST TROMETHAMINE 250 UG/ML
250 INJECTION, SOLUTION INTRAMUSCULAR AS NEEDED
Status: DISCONTINUED | OUTPATIENT
Start: 2022-09-16 | End: 2022-09-18 | Stop reason: HOSPADM

## 2022-09-16 RX ORDER — SODIUM CHLORIDE 0.9 % (FLUSH) 0.9 %
3-10 SYRINGE (ML) INJECTION AS NEEDED
Status: DISCONTINUED | OUTPATIENT
Start: 2022-09-16 | End: 2022-09-16

## 2022-09-16 RX ORDER — ACETAMINOPHEN 325 MG/1
650 TABLET ORAL EVERY 4 HOURS PRN
Status: DISCONTINUED | OUTPATIENT
Start: 2022-09-16 | End: 2022-09-16 | Stop reason: HOSPADM

## 2022-09-16 RX ADMIN — DOCUSATE SODIUM 100 MG: 100 CAPSULE, LIQUID FILLED ORAL at 20:38

## 2022-09-16 RX ADMIN — MINERAL OIL 30 ML: 1000 SOLUTION ORAL at 15:33

## 2022-09-16 RX ADMIN — OXYTOCIN 2 MILLI-UNITS/MIN: 10 INJECTION, SOLUTION INTRAMUSCULAR; INTRAVENOUS at 07:02

## 2022-09-16 RX ADMIN — ACETAMINOPHEN 650 MG: 325 TABLET, FILM COATED ORAL at 16:10

## 2022-09-16 RX ADMIN — HYDROCORTISONE 2.5% 1 APPLICATION: 25 CREAM TOPICAL at 20:47

## 2022-09-16 RX ADMIN — SODIUM CHLORIDE, POTASSIUM CHLORIDE, SODIUM LACTATE AND CALCIUM CHLORIDE 125 ML/HR: 600; 310; 30; 20 INJECTION, SOLUTION INTRAVENOUS at 06:35

## 2022-09-16 RX ADMIN — WITCH HAZEL 1 PAD: 500 SOLUTION RECTAL; TOPICAL at 20:38

## 2022-09-16 RX ADMIN — IBUPROFEN 600 MG: 600 TABLET ORAL at 20:38

## 2022-09-16 RX ADMIN — Medication: at 20:38

## 2022-09-16 RX ADMIN — OXYTOCIN 85 ML/HR: 10 INJECTION INTRAVENOUS at 17:13

## 2022-09-16 NOTE — PROGRESS NOTES
[facility]     Labor Progress Note    CC: Labor    IUP 39w0d, getting more uncomfortable    Vitals:    09/16/22 1220   BP: 107/67   Pulse: 85   Resp:    Temp:    SpO2:        Fetal Heart Rate Assessment   Method: Fetal HR Assessment Method: external   Beats/min: Fetal HR (beats/min): 125   Baseline: Fetal HR Baseline: normal range   Varibility: Fetal HR Variability: moderate (amplitude range 6 to 25 bpm)   Accels: Fetal HR Accelerations: greater than/equal to 15 bpm, lasting at least 15 seconds   Decels: Fetal HR Decelerations: absent   Tracing Category:       Uterine Assessment   Method: Method: external tocotransducer   Frequency (min): Contraction Frequency (Minutes): poor tracing TOCO adj   Ctx Count in 10 min: Contractions in 10 Minutes: 2-3   Duration:     Intensity: Contraction Intensity: no contractions   Intensity by IUPC:     Resting Tone: Uterine Resting Tone: soft by palpation   Resting Tone by IUPC:       Cervix: Exam by: Method: sterile exam per physician (Shashi)   Dilation: Cervical Dilation (cm): 3   Effacement: Cervical Effacement: 90%   Station: Fetal Station: -1            Assessment: IUP 39w0d     Plan: cont PPP    Angelique Danielle MD  12:35 EDT  09/16/22

## 2022-09-16 NOTE — PLAN OF CARE
Goal Outcome Evaluation:           Problem: Breastfeeding  Goal: Effective Breastfeeding  Outcome: Ongoing, Progressing  Intervention: Support Exclusive Breastfeeding Success  Flowsheets (Taken 9/16/2022 1738)  Supportive Measures:   active listening utilized   counseling provided   positive reinforcement provided  Breastfeeding Support:   lactation counseling provided   encouragement provided  Intervention: Promote Effective Breastfeeding  Flowsheets (Taken 9/16/2022 1738)  Parent/Child Attachment Promotion:   caring behavior modeled   cue recognition promoted   positive reinforcement provided   strengths emphasized   skin-to-skin contact encouraged

## 2022-09-16 NOTE — LACTATION NOTE
22 1738   Maternal Information   Date of Referral 22   Person Making Referral lactation consultant   Maternal Reason for Referral   (2nd time mother; nursed first child for 9 months)   Infant Reason for Referral  infant   Maternal Assessment   Breast Size Issue none   Left Nipple Symptoms intact;nontender   Right Nipple Symptoms intact;nontender   Maternal Infant Feeding   Maternal Emotional State independent;receptive;relaxed   Latch Assistance verbal guidance offered   Support Person Involvement actively supporting mother   Milk Expression/Equipment   Breast Pump Type double electric, personal   Equipment for Home Use pump not needed at this time  (has personal Medela)   Second-time mother; nursed first child for 9 months; current child not in room at time of lactation visit; went over educational materials; has personal Medela pump; current infant brought to mother, but visitors were in room at the time; verbal guidance on deep latch and positioning; to follow up with mother in the morning; mother in agreement.

## 2022-09-16 NOTE — L&D DELIVERY NOTE
[facility]  Vaginal Delivery Note  Review the Delivery Report for details.       Delivery     Delivery: Vaginal, Spontaneous     YOB: 2024    Time of Birth:  Gestational Age 3:49 PM   39w0d     Anesthesia: None     Delivering clinician: Angelique Danielle    Forceps?   No   Vacuum? No    Shoulder dystocia present: No        Delivery narrative:  The patient progressed to complete and delivered a VMI  with Apagrs 7/8 the weight is  8#3 via  with epidural anesthesia. Anterior shoulder was slow to deliver, but was delivered after maternal repositioning.The cord was clamped and cut after 45 second delay and the infant was placed on the mother's chest for skin- to - skin. Cord blood and gases were obtained and the placenta was delivered spontaneously intact. 30 units of IV Pitocin was started. Uterine tone was appropriate.   no lacerations were noted..   The patient tolerated the procedure well and remained in the LDR for recovery. All counts correct.      Infant    Findings: male  infant  3710 g (8 lb 2.9 oz)     Apgars:   7@ 1 minute /      8@ 5 minutes         Placenta, Cord, and Fluid    Placenta delivered  Spontaneous  at   2022  3:52 PM     Cord:   present.   Nuchal Cord?  no   Cord blood obtained: Yes    Cord gases obtained:  Yes    Cord gas results: Venous:  No results found for: PHCVEN    Arterial:  No results found for: PHCART     Repair    Episiotomy: None     No    Lacerations: No     Quantitative Blood Loss:  50mL            Complications  none    Disposition  Mother to Mother Baby/Postpartum  in stable condition currently.  Baby to remains with mom  in stable condition currently.      Angelique Danielle MD  22  16:13 EDT

## 2022-09-16 NOTE — H&P
[facility]  Obstetric History and Physical    CC: IOL    SUBJECTIVE:     Patient is a 32 y.o. female  currently at 39w0d, who presents with uncomplicated pregnancy for IOL. No complaints today      Prenatal Information:  Prenatal Results     POC Urine Glucose/Protein     Test Value Reference Range Date Time    Urine Glucose        Urine Protein              Initial Prenatal Labs     Test Value Reference Range Date Time    Hemoglobin  13.2 g/dL 12.0 - 15.9 22 0834    Hematocrit  39.0 % 34.0 - 46.6 22 0834    Platelets  310 10*3/mm3 140 - 450 22 0834    Rubella IgG  5.91 index Immune >0.99 22 0834    Hepatitis B SAg  Non-Reactive  Non-Reactive 22    Hepatitis C Ab  Non-Reactive  Non-Reactive 22    RPR  Non-Reactive  Non-Reactive 22 0834    ABO  O   22    Rh  Positive   22    Antibody Screen  Negative   22    HIV  Non-Reactive  Non-Reactive 19 1232    Urine Culture        Gonorrhea        Chlamydia        TSH  0.106 uIU/mL 0.270 - 4.200 22 0834    HgB A1c               2nd and 3rd Trimester     Test Value Reference Range Date Time    Hemoglobin (repeated)  8.4 g/dL 12.0 - 15.9 22 06    Hematocrit (repeated)  27.0 % 34.0 - 46.6 22 0612    Platelets   493 10*3/mm3 140 - 450 22 0612       310 10*3/mm3 140 - 450 2234    GCT        Antibody Screen (repeated)  Negative   22    GTT Fasting        GTT 1 Hr        GTT 2 Hr        GTT 3 Hr        Group B Strep ^ Negative   22           Drug Screening     Test Value Reference Range Date Time    Amphetamine Screen  Negative  Negative 22 0834    Barbiturate Screen  Negative  Negative 2234    Benzodiazepine Screen  Negative  Negative 22 0834    Methadone Screen  Negative  Negative 22 0834    Phencyclidine Screen  Negative  Negative 22 0834    Opiates Screen  Negative  Negative 22 0834    THC  Screen  Negative  Negative 03/04/22 0834    Cocaine Screen  Negative  Negative 03/04/22 0834    Propoxyphene Screen  Negative  Negative 03/04/22 0834    Buprenorphine Screen  Negative  Negative 03/04/22 0834    Methamphetamine Screen  Negative  Negative 03/04/22 0834    Oxycodone Screen  Negative  Negative 03/04/22 0834    Tricyclic Antidepressants Screen  Negative  Negative 03/04/22 0834          Other (Risk screening)     Test Value Reference Range Date Time    Varicella IgG        Parvovirus IgG        CMV IgG        Cystic Fibrosis        Hemoglobin electrophoresis        NIPT        MSAFP-4        AFP (for NTD only)              Legend    ^: Historical                      External Prenatal Results     Pregnancy Outside Results - Transcribed From Office Records - See Scanned Records For Details     Test Value Date Time    ABO  O  09/16/22 0612    Rh  Positive  09/16/22 0612    Antibody Screen  Negative  09/16/22 0612       Negative  03/04/22 0834    Varicella IgG       Rubella  5.91 index 03/04/22 0834    Hgb  8.4 g/dL 09/16/22 0612       13.2 g/dL 03/04/22 0834    Hct  27.0 % 09/16/22 0612       39.0 % 03/04/22 0834    Glucose Fasting GTT       Glucose Tolerance Test 1 hour       Glucose Tolerance Test 3 hour       Gonorrhea (discrete)       Chlamydia (discrete)       RPR  Non-Reactive  03/04/22 0834    VDRL       Syphilis Antibody       HBsAg  Non-Reactive  03/04/22 0834    Herpes Simplex Virus PCR       Herpes Simplex VIrus Culture       HIV  Non-Reactive  09/03/19 1232    Hep C RNA Quant PCR       Hep C Antibody  Non-Reactive  03/04/22 0834    AFP       Group B Strep ^ Negative  08/30/22     GBS Susceptibility to Clindamycin       GBS Susceptibility to Erythromycin       Fetal Fibronectin       Genetic Testing, Maternal Blood             Drug Screening     Test Value Date Time    Urine Drug Screen       Amphetamine Screen  Negative  03/04/22 0834    Barbiturate Screen  Negative  03/04/22 0834     Benzodiazepine Screen  Negative  22 0834    Methadone Screen  Negative  22 0834    Phencyclidine Screen  Negative  22 0834    Opiates Screen  Negative  22 0834    THC Screen  Negative  22 0834    Cocaine Screen       Propoxyphene Screen  Negative  22 0834    Buprenorphine Screen  Negative  22 0834    Methamphetamine Screen       Oxycodone Screen  Negative  22 0834    Tricyclic Antidepressants Screen  Negative  22 0834          Legend    ^: Historical                         OB History:                     OB History    Para Term  AB Living   3 1 1 0 1 1   SAB IAB Ectopic Molar Multiple Live Births   0 0 0 0 0 1      # Outcome Date GA Lbr Khoa/2nd Weight Sex Delivery Anes PTL Lv   3 Current            2 Term 20 40w0d 17:46 / 00:24 3175 g (7 lb) F Vag-Spont EPI N LULÚ      Name: LETICIA ROMERO      Apgar1: 8  Apgar5: 9   1 AB  6w0d             Allergies:                      No Known Allergies   Past Medical History: Past Medical History:   Diagnosis Date   • Heartburn    • PAC (premature atrial contraction)     has had echo in past, no current treatments.      Past Surgical History Past Surgical History:   Procedure Laterality Date   • WISDOM TOOTH EXTRACTION        Family History: No family history on file.   Social History:  reports that she has never smoked. She has never used smokeless tobacco.   reports previous alcohol use.   reports no history of drug use.    General ROS: Pertinent items are noted in HPI, all other systems reviewed and negative   Medications: Prenatal 27-1, docusate sodium, and esomeprazole       Objective       Vital Signs Range for the last 24 hours  Temperature: Temp:  [97.7 °F (36.5 °C)-97.9 °F (36.6 °C)] 97.7 °F (36.5 °C)   Temp Source: Temp src: Oral   BP: BP: (109-118)/(65-71) 109/65   Pulse: Heart Rate:  [] 88   Respirations: Resp:  [16] 16   SPO2:     O2 Amount (l/min):     O2 Devices      Weight: Weight:  [76.2 kg (168 lb)] 76.2 kg (168 lb)     Physical Examination: General appearance - alert, well appearing, and in no distress  Chest - clear to auscultation, no wheezes, rales or rhonchi, symmetric air entry  Heart - normal rate, regular rhythm, normal S1, S2, no murmurs, rubs, clicks or gallops  Abdomen - Soft, gravid, nontender  Extremities - pedal edema tr +      Presentation: VTX   Cervix: Exam by: Method: sterile exam per physician   Dilation: Cervical Dilation (cm): 3   Effacement: Cervical Effacement: 70%   Station: Fetal Station: 1-->-2     AROM with clear fluid    Fetal Heart Rate Assessment   Method: Fetal HR Assessment Method: external   Beats/min: Fetal HR (beats/min): 135   Baseline: Fetal HR Baseline: normal range   Variability: Fetal HR Variability: moderate (amplitude range 6 to 25 bpm)   Accels: Fetal HR Accelerations: greater than/equal to 15 bpm, lasting at least 15 seconds   Decels: Fetal HR Decelerations: absent   Tracing Category:       Uterine Assessment   Method: Method: palpation   Frequency (min): Contraction Frequency (Minutes): x1   Ctx Count in 10 min:     Duration:     Intensity: Contraction Intensity: mild by palpation   Intensity by IUPC:     Resting Tone: Uterine Resting Tone: soft by palpation   Resting Tone by IUPC:     [place] Units:       Laboratory Results:    WBC   Date Value Ref Range Status   09/16/2022 12.92 (H) 3.40 - 10.80 10*3/mm3 Final     RBC   Date Value Ref Range Status   09/16/2022 3.15 (L) 3.77 - 5.28 10*6/mm3 Final     Hemoglobin   Date Value Ref Range Status   09/16/2022 8.4 (L) 12.0 - 15.9 g/dL Final     Hematocrit   Date Value Ref Range Status   09/16/2022 27.0 (L) 34.0 - 46.6 % Final     MCV   Date Value Ref Range Status   09/16/2022 85.7 79.0 - 97.0 fL Final     MCH   Date Value Ref Range Status   09/16/2022 26.7 26.6 - 33.0 pg Final     MCHC   Date Value Ref Range Status   09/16/2022 31.1 (L) 31.5 - 35.7 g/dL Final     RDW   Date Value  Ref Range Status   09/16/2022 13.2 12.3 - 15.4 % Final     RDW-SD   Date Value Ref Range Status   09/16/2022 41.1 37.0 - 54.0 fl Final     MPV   Date Value Ref Range Status   09/16/2022 10.6 6.0 - 12.0 fL Final     Platelets   Date Value Ref Range Status   09/16/2022 493 (H) 140 - 450 10*3/mm3 Final         Assessment/Plan:   IUP 39w0d     1. IOL: Plan PPP. Now s/p AROM  2. GBS neg  3. FWB reassuring      Angelique Danielle MD  9/16/2022  08:16 EDT

## 2022-09-17 LAB
BASOPHILS # BLD AUTO: 0.06 10*3/MM3 (ref 0–0.2)
BASOPHILS NFR BLD AUTO: 0.3 % (ref 0–1.5)
DEPRECATED RDW RBC AUTO: 40.7 FL (ref 37–54)
EOSINOPHIL # BLD AUTO: 0.02 10*3/MM3 (ref 0–0.4)
EOSINOPHIL NFR BLD AUTO: 0.1 % (ref 0.3–6.2)
ERYTHROCYTE [DISTWIDTH] IN BLOOD BY AUTOMATED COUNT: 13.4 % (ref 12.3–15.4)
HCT VFR BLD AUTO: 24.3 % (ref 34–46.6)
HGB BLD-MCNC: 7.8 G/DL (ref 12–15.9)
IMM GRANULOCYTES # BLD AUTO: 0.2 10*3/MM3 (ref 0–0.05)
IMM GRANULOCYTES NFR BLD AUTO: 0.8 % (ref 0–0.5)
LYMPHOCYTES # BLD AUTO: 3.11 10*3/MM3 (ref 0.7–3.1)
LYMPHOCYTES NFR BLD AUTO: 13.1 % (ref 19.6–45.3)
MCH RBC QN AUTO: 26.5 PG (ref 26.6–33)
MCHC RBC AUTO-ENTMCNC: 32.1 G/DL (ref 31.5–35.7)
MCV RBC AUTO: 82.7 FL (ref 79–97)
MONOCYTES # BLD AUTO: 1.53 10*3/MM3 (ref 0.1–0.9)
MONOCYTES NFR BLD AUTO: 6.4 % (ref 5–12)
NEUTROPHILS NFR BLD AUTO: 18.85 10*3/MM3 (ref 1.7–7)
NEUTROPHILS NFR BLD AUTO: 79.3 % (ref 42.7–76)
NRBC BLD AUTO-RTO: 0 /100 WBC (ref 0–0.2)
PLATELET # BLD AUTO: 451 10*3/MM3 (ref 140–450)
PMV BLD AUTO: 10.6 FL (ref 6–12)
RBC # BLD AUTO: 2.94 10*6/MM3 (ref 3.77–5.28)
WBC NRBC COR # BLD: 23.77 10*3/MM3 (ref 3.4–10.8)

## 2022-09-17 PROCEDURE — 85025 COMPLETE CBC W/AUTO DIFF WBC: CPT | Performed by: OBSTETRICS & GYNECOLOGY

## 2022-09-17 RX ORDER — FERROUS SULFATE 325(65) MG
325 TABLET ORAL
Status: DISCONTINUED | OUTPATIENT
Start: 2022-09-17 | End: 2022-09-18 | Stop reason: HOSPADM

## 2022-09-17 RX ADMIN — IBUPROFEN 600 MG: 600 TABLET ORAL at 04:47

## 2022-09-17 RX ADMIN — IBUPROFEN 600 MG: 600 TABLET ORAL at 11:23

## 2022-09-17 RX ADMIN — FERROUS SULFATE TAB 325 MG (65 MG ELEMENTAL FE) 325 MG: 325 (65 FE) TAB at 12:02

## 2022-09-17 RX ADMIN — DOCUSATE SODIUM 100 MG: 100 CAPSULE, LIQUID FILLED ORAL at 08:23

## 2022-09-17 RX ADMIN — IBUPROFEN 600 MG: 600 TABLET ORAL at 17:15

## 2022-09-17 RX ADMIN — PRENATAL VITAMINS-IRON FUMARATE 27 MG IRON-FOLIC ACID 0.8 MG TABLET 1 TABLET: at 08:24

## 2022-09-17 RX ADMIN — DOCUSATE SODIUM 100 MG: 100 CAPSULE, LIQUID FILLED ORAL at 20:14

## 2022-09-17 NOTE — LACTATION NOTE
Courtesy visit with mother; good nursing times with infant overnight; mother reporting all is going well with latch and nursing; answered questions; reiterated to allow infant to feed when infant is ready to come off breast and to always offer the second breast; encouraged to call lactation if needed any assistance or had questions/concerns.

## 2022-09-17 NOTE — PROGRESS NOTES
9/17/2022  PPD #1    Subjective   Shawnee feels well.  Patient describes her lochia as less than menses.  Pain is well controlled       Objective   Temp: Temp:  [97.9 °F (36.6 °C)-99 °F (37.2 °C)] 98.6 °F (37 °C) Temp src: Oral   BP: BP: ()/(51-68) 119/56        Pulse: Heart Rate:  [] 104  RR: Resp:  [16-18] 16    General:  No acute distress   Abdomen: Fundus firm and beneath umbilicus   Pelvis: deferred     Lab Results   Component Value Date    WBC 23.77 (H) 09/17/2022    HGB 7.8 (L) 09/17/2022    HCT 24.3 (L) 09/17/2022    MCV 82.7 09/17/2022     (H) 09/17/2022    AST 15 06/28/2018    ALT 12 06/28/2018    HEPBSAG Non-Reactive 03/04/2022     Results from last 7 days   Lab Units 09/16/22  0612   ABO TYPING  O   RH TYPING  Positive   ANTIBODY SCREEN  Negative       Assessment  1. PPD# 1 after vaginal delivery  2. Chronic iron deficiency anemia    Plan  1. Supportive care, discharge as clinically indicated.   2. FeS04 325 daily.       This note has been electronically signed.    Bernie Freire MD  10:37 EDT  September 17, 2022

## 2022-09-18 VITALS
HEIGHT: 63 IN | DIASTOLIC BLOOD PRESSURE: 69 MMHG | OXYGEN SATURATION: 100 % | HEART RATE: 78 BPM | WEIGHT: 168 LBS | TEMPERATURE: 98.5 F | BODY MASS INDEX: 29.77 KG/M2 | RESPIRATION RATE: 18 BRPM | SYSTOLIC BLOOD PRESSURE: 118 MMHG

## 2022-09-18 PROBLEM — O99.019 IRON DEFICIENCY ANEMIA DURING PREGNANCY: Status: ACTIVE | Noted: 2022-09-18

## 2022-09-18 PROBLEM — D50.9 IRON DEFICIENCY ANEMIA DURING PREGNANCY: Status: ACTIVE | Noted: 2022-09-18

## 2022-09-18 RX ORDER — IBUPROFEN 600 MG/1
600 TABLET ORAL EVERY 6 HOURS PRN
Qty: 20 TABLET | Refills: 0 | Status: SHIPPED | OUTPATIENT
Start: 2022-09-18

## 2022-09-18 RX ADMIN — FERROUS SULFATE TAB 325 MG (65 MG ELEMENTAL FE) 325 MG: 325 (65 FE) TAB at 08:32

## 2022-09-18 RX ADMIN — DOCUSATE SODIUM 100 MG: 100 CAPSULE, LIQUID FILLED ORAL at 08:32

## 2022-09-18 RX ADMIN — IBUPROFEN 600 MG: 600 TABLET ORAL at 08:32

## 2022-09-18 RX ADMIN — PRENATAL VITAMINS-IRON FUMARATE 27 MG IRON-FOLIC ACID 0.8 MG TABLET 1 TABLET: at 08:32

## 2022-09-18 NOTE — PROGRESS NOTES
9/18/2022  PPD #2    Subjective   Shawnee feels well.  Patient describes her lochia as less than menses.  Pain is well controlled       Objective   Temp: Temp:  [97.9 °F (36.6 °C)-98.8 °F (37.1 °C)] 98.5 °F (36.9 °C) Temp src: Oral   BP: BP: (111-118)/(66-69) 118/69        Pulse: Heart Rate:  [73-81] 78  RR: Resp:  [16-18] 18    General:  No acute distress   Abdomen: Fundus firm and beneath umbilicus   Pelvis: deferred     Lab Results   Component Value Date    WBC 23.77 (H) 09/17/2022    HGB 7.8 (L) 09/17/2022    HCT 24.3 (L) 09/17/2022    MCV 82.7 09/17/2022     (H) 09/17/2022    HEPBSAG Non-Reactive 03/04/2022    AST 15 06/28/2018       Assessment  1. PPD# 2 after vaginal delivery    Plan  1. Discharge to home  2. Follow up with LW in 6 weeks  3. Prescription for Motrin.  4. Advised no tampons, intercourse, or tub baths for 6 weeks.       This note has been electronically signed.    Bernie Freire MD  10:19 EDT  September 18, 2022

## 2022-09-25 NOTE — PROGRESS NOTES
[facility]  Vaginal Delivery Progress Note    Subjective     Doing well, pain controlled, lochia less than menses      Objective     Vital Signs Range for the last 24 hours  Temperature: Temp:  [98 °F (36.7 °C)-99.2 °F (37.3 °C)] 98.1 °F (36.7 °C)   Temp Source: Temp src: Oral   BP: BP: ()/(50-81) 119/68   Pulse: Heart Rate:  [68-94] 94   Respirations: Resp:  [16-18] 18   SPO2:     O2 Amount (l/min):     O2 Devices           Physical Exam:  General:  no acute distresss.  Abdomen: Soft, non-tender, fundus firm  Extremities: normal, atraumatic, no cyanosis, and trace edema.       Lab results reviewed:  Yes    Lab Results   Component Value Date    WBC 22.52 (H) 2020    HGB 9.6 (L) 2020    HCT 29.2 (L) 2020    MCV 94.8 2020     2020         Assessment/Plan        (normal spontaneous vaginal delivery)      Shawnee Walker is Day 1  post-partum       Plan:  Continue current care.  Start po iron    Sari Toure CNM  3/20/2020  08:51   Xray at bedside for imaging

## 2023-07-07 PROCEDURE — 88305 TISSUE EXAM BY PATHOLOGIST: CPT

## 2023-07-10 ENCOUNTER — LAB REQUISITION (OUTPATIENT)
Dept: LAB | Facility: HOSPITAL | Age: 33
End: 2023-07-10
Payer: COMMERCIAL

## 2023-07-10 DIAGNOSIS — R13.10 DYSPHAGIA, UNSPECIFIED: ICD-10-CM

## 2023-07-10 DIAGNOSIS — K31.89 OTHER DISEASES OF STOMACH AND DUODENUM: ICD-10-CM

## 2023-07-11 LAB — REF LAB TEST METHOD: NORMAL

## 2023-09-15 RX ORDER — PLECANATIDE 3 MG/1
3 TABLET ORAL DAILY
Qty: 90 TABLET | Refills: 3 | Status: SHIPPED | OUTPATIENT
Start: 2023-09-15

## 2023-11-10 ENCOUNTER — HOSPITAL ENCOUNTER (EMERGENCY)
Facility: HOSPITAL | Age: 33
Discharge: HOME | End: 2023-11-10
Attending: EMERGENCY MEDICINE
Payer: COMMERCIAL

## 2023-11-10 ENCOUNTER — APPOINTMENT (OUTPATIENT)
Dept: GENERAL RADIOLOGY | Facility: HOSPITAL | Age: 33
End: 2023-11-10
Payer: COMMERCIAL

## 2023-11-10 VITALS
WEIGHT: 140 LBS | RESPIRATION RATE: 18 BRPM | DIASTOLIC BLOOD PRESSURE: 89 MMHG | OXYGEN SATURATION: 100 % | HEART RATE: 73 BPM | BODY MASS INDEX: 24.8 KG/M2 | SYSTOLIC BLOOD PRESSURE: 112 MMHG | HEIGHT: 63 IN | TEMPERATURE: 98.1 F

## 2023-11-10 DIAGNOSIS — R10.9 ACUTE ABDOMINAL PAIN: Primary | ICD-10-CM

## 2023-11-10 LAB
ALBUMIN SERPL-MCNC: 4.6 G/DL (ref 3.5–5.2)
ALBUMIN/GLOB SERPL: 1.8 G/DL
ALP SERPL-CCNC: 52 U/L (ref 39–117)
ALT SERPL W P-5'-P-CCNC: 7 U/L (ref 1–33)
ANION GAP SERPL CALCULATED.3IONS-SCNC: 10 MMOL/L (ref 5–15)
AST SERPL-CCNC: 15 U/L (ref 1–32)
B-HCG UR QL: NEGATIVE
BACTERIA UR QL AUTO: ABNORMAL /HPF
BASOPHILS # BLD AUTO: 0.03 10*3/MM3 (ref 0–0.2)
BASOPHILS NFR BLD AUTO: 0.4 % (ref 0–1.5)
BILIRUB SERPL-MCNC: 1.1 MG/DL (ref 0–1.2)
BILIRUB UR QL STRIP: NEGATIVE
BUN SERPL-MCNC: 11 MG/DL (ref 6–20)
BUN/CREAT SERPL: 15.7 (ref 7–25)
CALCIUM SPEC-SCNC: 9.3 MG/DL (ref 8.6–10.5)
CHLORIDE SERPL-SCNC: 101 MMOL/L (ref 98–107)
CLARITY UR: CLEAR
CO2 SERPL-SCNC: 25 MMOL/L (ref 22–29)
COLOR UR: YELLOW
CREAT SERPL-MCNC: 0.7 MG/DL (ref 0.57–1)
DEPRECATED RDW RBC AUTO: 42.8 FL (ref 37–54)
EGFRCR SERPLBLD CKD-EPI 2021: 115.8 ML/MIN/1.73
EOSINOPHIL # BLD AUTO: 0.01 10*3/MM3 (ref 0–0.4)
EOSINOPHIL NFR BLD AUTO: 0.1 % (ref 0.3–6.2)
ERYTHROCYTE [DISTWIDTH] IN BLOOD BY AUTOMATED COUNT: 12.9 % (ref 12.3–15.4)
GLOBULIN UR ELPH-MCNC: 2.6 GM/DL
GLUCOSE SERPL-MCNC: 124 MG/DL (ref 65–99)
GLUCOSE UR STRIP-MCNC: NEGATIVE MG/DL
HCT VFR BLD AUTO: 38.1 % (ref 34–46.6)
HGB BLD-MCNC: 12.5 G/DL (ref 12–15.9)
HGB UR QL STRIP.AUTO: ABNORMAL
HYALINE CASTS UR QL AUTO: ABNORMAL /LPF
IMM GRANULOCYTES # BLD AUTO: 0.02 10*3/MM3 (ref 0–0.05)
IMM GRANULOCYTES NFR BLD AUTO: 0.3 % (ref 0–0.5)
KETONES UR QL STRIP: ABNORMAL
LEUKOCYTE ESTERASE UR QL STRIP.AUTO: ABNORMAL
LIPASE SERPL-CCNC: 29 U/L (ref 13–60)
LYMPHOCYTES # BLD AUTO: 0.97 10*3/MM3 (ref 0.7–3.1)
LYMPHOCYTES NFR BLD AUTO: 13.2 % (ref 19.6–45.3)
MCH RBC QN AUTO: 29.5 PG (ref 26.6–33)
MCHC RBC AUTO-ENTMCNC: 32.8 G/DL (ref 31.5–35.7)
MCV RBC AUTO: 89.9 FL (ref 79–97)
MONOCYTES # BLD AUTO: 0.24 10*3/MM3 (ref 0.1–0.9)
MONOCYTES NFR BLD AUTO: 3.3 % (ref 5–12)
NEUTROPHILS NFR BLD AUTO: 6.09 10*3/MM3 (ref 1.7–7)
NEUTROPHILS NFR BLD AUTO: 82.7 % (ref 42.7–76)
NITRITE UR QL STRIP: NEGATIVE
NRBC BLD AUTO-RTO: 0 /100 WBC (ref 0–0.2)
PH UR STRIP.AUTO: 6 [PH] (ref 5–8)
PLATELET # BLD AUTO: 388 10*3/MM3 (ref 140–450)
PMV BLD AUTO: 9.1 FL (ref 6–12)
POTASSIUM SERPL-SCNC: 3.9 MMOL/L (ref 3.5–5.2)
PROT SERPL-MCNC: 7.2 G/DL (ref 6–8.5)
PROT UR QL STRIP: NEGATIVE
RBC # BLD AUTO: 4.24 10*6/MM3 (ref 3.77–5.28)
RBC # UR STRIP: ABNORMAL /HPF
REF LAB TEST METHOD: ABNORMAL
SODIUM SERPL-SCNC: 136 MMOL/L (ref 136–145)
SP GR UR STRIP: 1.03 (ref 1–1.03)
SQUAMOUS #/AREA URNS HPF: ABNORMAL /HPF
UROBILINOGEN UR QL STRIP: ABNORMAL
WBC # UR STRIP: ABNORMAL /HPF
WBC NRBC COR # BLD: 7.36 10*3/MM3 (ref 3.4–10.8)

## 2023-11-10 PROCEDURE — 96374 THER/PROPH/DIAG INJ IV PUSH: CPT

## 2023-11-10 PROCEDURE — 81025 URINE PREGNANCY TEST: CPT | Performed by: EMERGENCY MEDICINE

## 2023-11-10 PROCEDURE — 96375 TX/PRO/DX INJ NEW DRUG ADDON: CPT

## 2023-11-10 PROCEDURE — 25010000002 ONDANSETRON PER 1 MG: Performed by: EMERGENCY MEDICINE

## 2023-11-10 PROCEDURE — 81001 URINALYSIS AUTO W/SCOPE: CPT | Performed by: EMERGENCY MEDICINE

## 2023-11-10 PROCEDURE — 99283 EMERGENCY DEPT VISIT LOW MDM: CPT

## 2023-11-10 PROCEDURE — 83690 ASSAY OF LIPASE: CPT | Performed by: EMERGENCY MEDICINE

## 2023-11-10 PROCEDURE — 25810000003 SODIUM CHLORIDE 0.9 % SOLUTION: Performed by: EMERGENCY MEDICINE

## 2023-11-10 PROCEDURE — 74018 RADEX ABDOMEN 1 VIEW: CPT

## 2023-11-10 PROCEDURE — 85025 COMPLETE CBC W/AUTO DIFF WBC: CPT | Performed by: EMERGENCY MEDICINE

## 2023-11-10 PROCEDURE — 80053 COMPREHEN METABOLIC PANEL: CPT | Performed by: EMERGENCY MEDICINE

## 2023-11-10 RX ORDER — SODIUM CHLORIDE 0.9 % (FLUSH) 0.9 %
10 SYRINGE (ML) INJECTION AS NEEDED
Status: DISCONTINUED | OUTPATIENT
Start: 2023-11-10 | End: 2023-11-10 | Stop reason: HOSPADM

## 2023-11-10 RX ORDER — ONDANSETRON 4 MG/1
4 TABLET, ORALLY DISINTEGRATING ORAL EVERY 8 HOURS PRN
Qty: 10 TABLET | Refills: 0 | Status: SHIPPED | OUTPATIENT
Start: 2023-11-10

## 2023-11-10 RX ORDER — PANTOPRAZOLE SODIUM 40 MG/10ML
40 INJECTION, POWDER, LYOPHILIZED, FOR SOLUTION INTRAVENOUS ONCE
Status: COMPLETED | OUTPATIENT
Start: 2023-11-10 | End: 2023-11-10

## 2023-11-10 RX ORDER — DICYCLOMINE HYDROCHLORIDE 10 MG/1
20 CAPSULE ORAL ONCE
Status: COMPLETED | OUTPATIENT
Start: 2023-11-10 | End: 2023-11-10

## 2023-11-10 RX ORDER — ONDANSETRON 2 MG/ML
4 INJECTION INTRAMUSCULAR; INTRAVENOUS ONCE
Status: COMPLETED | OUTPATIENT
Start: 2023-11-10 | End: 2023-11-10

## 2023-11-10 RX ORDER — DICYCLOMINE HCL 20 MG
20 TABLET ORAL EVERY 8 HOURS PRN
Qty: 30 TABLET | Refills: 0 | Status: SHIPPED | OUTPATIENT
Start: 2023-11-10

## 2023-11-10 RX ADMIN — PANTOPRAZOLE SODIUM 40 MG: 40 INJECTION, POWDER, LYOPHILIZED, FOR SOLUTION INTRAVENOUS at 06:54

## 2023-11-10 RX ADMIN — SODIUM CHLORIDE 1000 ML: 9 INJECTION, SOLUTION INTRAVENOUS at 06:54

## 2023-11-10 RX ADMIN — DICYCLOMINE HYDROCHLORIDE 20 MG: 10 CAPSULE ORAL at 08:36

## 2023-11-10 RX ADMIN — ONDANSETRON 4 MG: 2 INJECTION INTRAMUSCULAR; INTRAVENOUS at 06:53

## 2023-11-10 NOTE — DISCHARGE INSTRUCTIONS
Call Dr. Keys today and ask them to help get your Amitiza approved.  I have sent in a prescription for nausea medicine and a medicine for abdominal cramping.  Return anytime if worsening pain or any other concerns.  Always drink extra fluids and observe a high fiber diet

## 2023-11-10 NOTE — ED PROVIDER NOTES
Subjective   History of Present Illness  Mrs Walker presents with abdominal pain, vomiting, and diarrhea.  She tells me that over the last couple of days she has had nausea and cramping with constipation.  Starting last night began having vomiting and diarrhea with epigastric abdominal pain.  It radiates into her back.  She has history of IBS with mostly constipation.  She is on Trulance.  She has also been diagnosed with ulcers and GERD.  She sees Dr. Keys in the gastroenterology clinic here.  She has had no prior abdominal surgeries.      Review of Systems    Past Medical History:   Diagnosis Date    GERD (gastroesophageal reflux disease) 2006    Heartburn     Hyperlipidemia 2019    Irritable bowel syndrome 2010    PAC (premature atrial contraction)     has had echo in past, no current treatments.    Ulcer 2006       No Known Allergies    Past Surgical History:   Procedure Laterality Date    UPPER GASTROINTESTINAL ENDOSCOPY  2006, 2016    WISDOM TOOTH EXTRACTION         Family History   Problem Relation Age of Onset    Cirrhosis Father     Alcohol abuse Father        Social History     Socioeconomic History    Marital status:    Tobacco Use    Smoking status: Never    Smokeless tobacco: Never   Vaping Use    Vaping Use: Never used   Substance and Sexual Activity    Alcohol use: Yes     Alcohol/week: 4.0 standard drinks of alcohol     Types: 2 Glasses of wine, 2 Drinks containing 0.5 oz of alcohol per week     Comment: occasionally    Drug use: No    Sexual activity: Yes     Partners: Male     Birth control/protection: None           Objective   Physical Exam  Vitals and nursing note reviewed.   Constitutional:       General: She is not in acute distress.     Appearance: Normal appearance.   HENT:      Head: Normocephalic and atraumatic.      Nose: Nose normal. No congestion or rhinorrhea.   Eyes:      General: No scleral icterus.     Conjunctiva/sclera: Conjunctivae normal.   Neck:      Comments:  No JVD   Cardiovascular:      Rate and Rhythm: Normal rate and regular rhythm.      Heart sounds: No murmur heard.     No friction rub.   Pulmonary:      Effort: Pulmonary effort is normal.      Breath sounds: Normal breath sounds. No wheezing or rales.   Abdominal:      General: Bowel sounds are normal.      Palpations: Abdomen is soft.      Tenderness: There is abdominal tenderness. There is no guarding or rebound.      Comments: She has mild diffuse tenderness.  Abdomen is soft with active bowel sounds.   Musculoskeletal:         General: No tenderness.      Cervical back: Normal range of motion and neck supple.      Right lower leg: No edema.      Left lower leg: No edema.   Skin:     General: Skin is warm and dry.      Coloration: Skin is not pale.      Findings: No erythema.   Neurological:      General: No focal deficit present.      Mental Status: She is alert and oriented to person, place, and time.      Motor: No weakness.      Coordination: Coordination normal.   Psychiatric:         Mood and Affect: Mood normal.         Behavior: Behavior normal.         Thought Content: Thought content normal.         Procedures           ED Course  ED Course as of 11/10/23 1424   Fri Nov 10, 2023   0821 She feels better on repeat exam.  No further vomiting or diarrhea.  She tells me the burning is better but still has cramping.  We will give Bentyl.  I took her some clear liquids to drink [DT]   0924 She feels better.  She tells me most of her pain is gone.  She was prescribed Amitiza and the samples really helped but she is still trying to get her insurance company to pay for it.  I encouraged her to continue to work with Dr. Keys. [DT]   1021 I have reviewed her KUB images as well as the report and there is no evidence of impaction or obstruction.  Looks to be normal [DT]      ED Course User Index  [DT] Stephen Ventura MD                                           Medical Decision Making  Please see course notes.   I reviewed and interpreted prior records regarding history of IBS.  I ordered and interpreted multiple labs showing no evidence of pancreatitis or other serious acute problem.  I gave multiple IV medications and observed her for several hours in the emergency department.  Ultimately ordered and interpreted KUB ruling out constipation and fecal impaction.    Problems Addressed:  Acute abdominal pain: complicated acute illness or injury that poses a threat to life or bodily functions    Amount and/or Complexity of Data Reviewed  Labs: ordered. Decision-making details documented in ED Course.  Radiology: ordered. Decision-making details documented in ED Course.    Risk  Prescription drug management.        Final diagnoses:   Acute abdominal pain       ED Disposition  ED Disposition       ED Disposition   Discharge    Condition   Stable    Comment   --               Jose L Hahn MD  [address] [facility]  [address]  [place] [id number]  [phone]          Syed Keys MD  [address] [facility] [address]  [place] [address]  [phone]               Medication List        New Prescriptions      dicyclomine 20 MG tablet  Commonly known as: BENTYL  Take 1 tablet by mouth Every 8 (Eight) Hours As Needed for Abdominal Cramping.     ondansetron ODT 4 MG disintegrating tablet  Commonly known as: ZOFRAN-ODT  Place 1 tablet on the tongue Every 8 (Eight) Hours As Needed for Nausea or Vomiting.            Stop      ibuprofen 600 MG tablet  Commonly known as: ADVIL,MOTRIN               Where to Get Your Medications        These medications were sent to [facility] - [address], [place] [address]      Hours: Monday to Friday 7 AM to 5:30 PM, Saturday & Sunday 8 AM to 4:30 PM Phone: [phone]   dicyclomine 20 MG tablet  ondansetron ODT 4 MG disintegrating tablet            Stephen Ventura MD  11/10/23 1415

## 2023-11-14 ENCOUNTER — OFFICE VISIT (OUTPATIENT)
Dept: GASTROENTEROLOGY | Facility: CLINIC | Age: 33
End: 2023-11-14
Payer: COMMERCIAL

## 2023-11-14 ENCOUNTER — LAB (OUTPATIENT)
Dept: LAB | Facility: HOSPITAL | Age: 33
End: 2023-11-14
Payer: COMMERCIAL

## 2023-11-14 VITALS
WEIGHT: 137.6 LBS | HEIGHT: 63 IN | SYSTOLIC BLOOD PRESSURE: 120 MMHG | HEART RATE: 97 BPM | BODY MASS INDEX: 24.38 KG/M2 | DIASTOLIC BLOOD PRESSURE: 72 MMHG | TEMPERATURE: 97.5 F | OXYGEN SATURATION: 99 %

## 2023-11-14 DIAGNOSIS — K21.9 GASTROESOPHAGEAL REFLUX DISEASE, UNSPECIFIED WHETHER ESOPHAGITIS PRESENT: Primary | ICD-10-CM

## 2023-11-14 DIAGNOSIS — R10.10 UPPER ABDOMINAL PAIN: ICD-10-CM

## 2023-11-14 DIAGNOSIS — R11.2 NAUSEA AND VOMITING, UNSPECIFIED VOMITING TYPE: ICD-10-CM

## 2023-11-14 DIAGNOSIS — K58.1 IRRITABLE BOWEL SYNDROME WITH CONSTIPATION: ICD-10-CM

## 2023-11-14 PROBLEM — F41.1 GENERALIZED ANXIETY DISORDER: Status: ACTIVE | Noted: 2023-11-14

## 2023-11-14 PROBLEM — Q39.3 CONGENITAL STENOSIS OF ESOPHAGUS: Status: ACTIVE | Noted: 2023-11-14

## 2023-11-14 PROBLEM — K21.00 GASTRO-ESOPHAGEAL REFLUX DISEASE WITH ESOPHAGITIS, WITHOUT BLEEDING: Status: ACTIVE | Noted: 2023-11-14

## 2023-11-14 LAB
ALBUMIN SERPL-MCNC: 5.1 G/DL (ref 3.5–5.2)
ALBUMIN/GLOB SERPL: 2.1 G/DL
ALP SERPL-CCNC: 47 U/L (ref 39–117)
ALT SERPL W P-5'-P-CCNC: 9 U/L (ref 1–33)
ANION GAP SERPL CALCULATED.3IONS-SCNC: 9 MMOL/L (ref 5–15)
AST SERPL-CCNC: 6 U/L (ref 1–32)
BASOPHILS # BLD AUTO: 0.04 10*3/MM3 (ref 0–0.2)
BASOPHILS NFR BLD AUTO: 0.6 % (ref 0–1.5)
BILIRUB SERPL-MCNC: 0.8 MG/DL (ref 0–1.2)
BUN SERPL-MCNC: 7 MG/DL (ref 6–20)
BUN/CREAT SERPL: 9.7 (ref 7–25)
CALCIUM SPEC-SCNC: 9.8 MG/DL (ref 8.6–10.5)
CHLORIDE SERPL-SCNC: 102 MMOL/L (ref 98–107)
CO2 SERPL-SCNC: 27 MMOL/L (ref 22–29)
CREAT SERPL-MCNC: 0.72 MG/DL (ref 0.57–1)
CRP SERPL-MCNC: <0.3 MG/DL (ref 0–0.5)
DEPRECATED RDW RBC AUTO: 41.2 FL (ref 37–54)
EGFRCR SERPLBLD CKD-EPI 2021: 112 ML/MIN/1.73
EOSINOPHIL # BLD AUTO: 0.02 10*3/MM3 (ref 0–0.4)
EOSINOPHIL NFR BLD AUTO: 0.3 % (ref 0.3–6.2)
ERYTHROCYTE [DISTWIDTH] IN BLOOD BY AUTOMATED COUNT: 12.9 % (ref 12.3–15.4)
GLOBULIN UR ELPH-MCNC: 2.4 GM/DL
GLUCOSE SERPL-MCNC: 85 MG/DL (ref 65–99)
HCT VFR BLD AUTO: 38.9 % (ref 34–46.6)
HGB BLD-MCNC: 12.9 G/DL (ref 12–15.9)
IMM GRANULOCYTES # BLD AUTO: 0.01 10*3/MM3 (ref 0–0.05)
IMM GRANULOCYTES NFR BLD AUTO: 0.2 % (ref 0–0.5)
LIPASE SERPL-CCNC: 29 U/L (ref 13–60)
LYMPHOCYTES # BLD AUTO: 1.58 10*3/MM3 (ref 0.7–3.1)
LYMPHOCYTES NFR BLD AUTO: 23.8 % (ref 19.6–45.3)
MCH RBC QN AUTO: 29.2 PG (ref 26.6–33)
MCHC RBC AUTO-ENTMCNC: 33.2 G/DL (ref 31.5–35.7)
MCV RBC AUTO: 88 FL (ref 79–97)
MONOCYTES # BLD AUTO: 0.44 10*3/MM3 (ref 0.1–0.9)
MONOCYTES NFR BLD AUTO: 6.6 % (ref 5–12)
NEUTROPHILS NFR BLD AUTO: 4.55 10*3/MM3 (ref 1.7–7)
NEUTROPHILS NFR BLD AUTO: 68.5 % (ref 42.7–76)
NRBC BLD AUTO-RTO: 0 /100 WBC (ref 0–0.2)
PLATELET # BLD AUTO: 391 10*3/MM3 (ref 140–450)
PMV BLD AUTO: 9.7 FL (ref 6–12)
POTASSIUM SERPL-SCNC: 4.6 MMOL/L (ref 3.5–5.2)
PROT SERPL-MCNC: 7.5 G/DL (ref 6–8.5)
RBC # BLD AUTO: 4.42 10*6/MM3 (ref 3.77–5.28)
SODIUM SERPL-SCNC: 138 MMOL/L (ref 136–145)
WBC NRBC COR # BLD: 6.64 10*3/MM3 (ref 3.4–10.8)

## 2023-11-14 PROCEDURE — 83690 ASSAY OF LIPASE: CPT | Performed by: PHYSICIAN ASSISTANT

## 2023-11-14 PROCEDURE — 36415 COLL VENOUS BLD VENIPUNCTURE: CPT

## 2023-11-14 PROCEDURE — 85025 COMPLETE CBC W/AUTO DIFF WBC: CPT

## 2023-11-14 PROCEDURE — 86140 C-REACTIVE PROTEIN: CPT

## 2023-11-14 PROCEDURE — 80053 COMPREHEN METABOLIC PANEL: CPT | Performed by: PHYSICIAN ASSISTANT

## 2023-11-14 RX ORDER — PLECANATIDE 3 MG/1
3 TABLET ORAL DAILY
Qty: 90 TABLET | Refills: 3 | Status: SHIPPED | OUTPATIENT
Start: 2023-11-14

## 2023-11-14 RX ORDER — SUCRALFATE 1 G/1
1 TABLET ORAL 4 TIMES DAILY
Qty: 56 TABLET | Refills: 0 | Status: SHIPPED | OUTPATIENT
Start: 2023-11-14 | End: 2023-11-28

## 2023-11-14 NOTE — PROGRESS NOTES
Follow Up      Patient Name: Shawnee Walker  : 1990   MRN: [id number]     Chief Complaint:  No chief complaint on file.      History of Present Illness: Shawnee Walker is a 33 y.o. female who is here today for ER follow up.  is with patient for visit today.     Pt was seen at [facility] ED 11/10 for evaluation of abdominal pain, vomiting, diarrhea. KUB revealed normal bowel gas pattern. CBC, CMP, lipase within normal limits.    Pt describes onset of generalized abdominal pain that was cramping and burning in nature and associated with nausea and emesis x 1. She was prescribed bentyl in the ED which has not changed her sx. She continues to experience epigastric pain that is burning in nature and radiates to her back, somewhat improved with avoiding eating any food. Her bowel pattern has returned to her baseline and she endorses (+) flatus.    She has previously been seen by Dr. Keys for evaluation of constipation and provided Trulance samples, which worked well for her constipation. She has previously not tolerated Amitiza due to bloating / cramping. She generally has a BM 2-3 times per week that ranges from small and incomplete to large and formed.     Patient denies associated fever, chills, indigestion, diarrhea, hematemesis, dysphagia, hematochezia, melena, weight loss or gain, dysuria, jaundice or bruising.    EGD 2023 with Dr. Keys. Fluid in lower third of esophagus. Normal upper / middle third of esophagus. Dilation performed with Savary dilator to 54 Fr. Mild gastritis in gastric body. Normal duodenum. Bx negative for EoE, H Pylori, celiac disease, intestinal metaplasia or dysplasia.     Subjective      Review of Systems:   Review of Systems   Constitutional:  Negative for appetite change, chills, diaphoresis, fatigue, fever, unexpected weight gain and unexpected weight loss.   HENT:  Negative for drooling, facial swelling, mouth sores, rhinorrhea, sore throat,  tinnitus, trouble swallowing and voice change.    Eyes: Negative.    Respiratory:  Negative for cough, chest tightness and shortness of breath.    Cardiovascular:  Negative for chest pain.   Gastrointestinal:  Positive for abdominal pain (epigastric), constipation, nausea and vomiting (x1, resolved). Negative for blood in stool, diarrhea, GERD and indigestion.   Genitourinary:  Negative for dysuria, flank pain, hematuria and pelvic pain.   Musculoskeletal:  Negative for arthralgias and myalgias.   Skin:  Negative for color change, pallor and rash.   Neurological:  Negative for dizziness, tremors, syncope, weakness and numbness.   Psychiatric/Behavioral:  Negative for hallucinations and sleep disturbance. The patient is not nervous/anxious.    All other systems reviewed and are negative.      Medications:     Current Outpatient Medications:     dicyclomine (BENTYL) 20 MG tablet, Take 1 tablet by mouth Every 8 (Eight) Hours As Needed for Abdominal Cramping., Disp: 30 tablet, Rfl: 0    docusate sodium (COLACE) 100 MG capsule, Take 1 capsule by mouth 2 (Two) Times a Day As Needed for Constipation., Disp: , Rfl:     esomeprazole (nexIUM) 40 MG capsule, Take 1 capsule by mouth Every Morning Before Breakfast., Disp: , Rfl:     ondansetron ODT (ZOFRAN-ODT) 4 MG disintegrating tablet, Place 1 tablet on the tongue Every 8 (Eight) Hours As Needed for Nausea or Vomiting., Disp: 10 tablet, Rfl: 0    Plecanatide (Trulance) 3 MG tablet, Take 1 tablet by mouth Daily., Disp: 90 tablet, Rfl: 3    Allergies:   No Known Allergies    Social History:   Social History     Socioeconomic History    Marital status:    Tobacco Use    Smoking status: Never    Smokeless tobacco: Never   Vaping Use    Vaping Use: Never used   Substance and Sexual Activity    Alcohol use: Yes     Alcohol/week: 4.0 standard drinks of alcohol     Types: 2 Glasses of wine, 2 Drinks containing 0.5 oz of alcohol per week     Comment: occasionally    Drug use:  No    Sexual activity: Yes     Partners: Male     Birth control/protection: None        Surgical History:   Past Surgical History:   Procedure Laterality Date    UPPER GASTROINTESTINAL ENDOSCOPY  2006, 2016    WISDOM TOOTH EXTRACTION          Medical History:   Past Medical History:   Diagnosis Date    GERD (gastroesophageal reflux disease) 2006    Heartburn     Hyperlipidemia 2019    Irritable bowel syndrome 2010    PAC (premature atrial contraction)     has had echo in past, no current treatments.    Ulcer 2006        Objective     Physical Exam:  Vital Signs: There were no vitals filed for this visit.  There is no height or weight on file to calculate BMI.     Physical Exam  Vitals and nursing note reviewed.   Constitutional:       Appearance: Normal appearance. She is normal weight. She is not ill-appearing or diaphoretic.   HENT:      Head: Normocephalic and atraumatic.      Right Ear: External ear normal.      Left Ear: External ear normal.      Nose: Nose normal.      Mouth/Throat:      Mouth: Mucous membranes are moist.      Pharynx: Oropharynx is clear.   Eyes:      Conjunctiva/sclera: Conjunctivae normal.      Pupils: Pupils are equal, round, and reactive to light.   Neck:      Thyroid: No thyromegaly.   Cardiovascular:      Rate and Rhythm: Normal rate and regular rhythm.      Pulses: Normal pulses.      Heart sounds: Normal heart sounds.   Pulmonary:      Effort: Pulmonary effort is normal.      Breath sounds: Normal breath sounds.   Abdominal:      General: Abdomen is flat. Bowel sounds are normal. There is no distension.      Tenderness: There is abdominal tenderness (epigastric).   Musculoskeletal:         General: Normal range of motion.      Cervical back: Normal range of motion and neck supple.   Skin:     General: Skin is warm and dry.   Neurological:      General: No focal deficit present.      Mental Status: She is oriented to person, place, and time.   Psychiatric:         Mood and Affect: Mood  normal.         Assessment / Plan      Assessment/Plan:   There are no diagnoses linked to this encounter.     Upper abdominal pain  Nausea and vomiting  GERD  IBS-C   - rx for carafate 1g QID x 14 days sent to pharmacy    - continue esomeprazole 40mg daily    - pt afforded Trulance 3mg samples, rx for such sent to pharmacy    - pt given GERD diet instructions, advised to avoid GI irritants such as caffeine, carbonation, EtOH, tobacco, chocolate, peppermint, acid-based foods.   - obtain CBC, CMP, CRP, lipase   - obtain gallbladder US, HIDA scan    - previous labs, imaging, endoscopy and pathology reports reviewed   - follow up in clinic after completion of above studies   - call clinic at any time for questions or new / worsened sx    Follow Up:   Return for Next scheduled follow up.    Plan of care reviewed with the patient at the conclusion of today's visit.  Education was provided regarding diagnosis, management, and any prescribed or recommended OTC medications.  Patient verbalized understanding of and agreement with management plan.     NOTE TO PATIENT: The 21st Century Cures Act makes medical notes like these available to patients in the interest of transparency. However, be advised this is a medical document. It is intended as peer to peer communication. It is written in medical language and may contain abbreviations or verbiage that are unfamiliar. It may appear blunt or direct. Medical documents are intended to carry relevant information, facts as evident, and the clinical opinion of the practitioner.     Time Statement:   Discussed plan of care in detail with patient today. Patient verbally understands and agrees. I have spent 30 minutes reviewing available diagnostics, obtaining history, examining the patient, developing a treatment plan, and educating the patient on disease process and plan of care.     Melani Moscoso PA-C   [facility]

## 2023-11-15 ENCOUNTER — HOSPITAL ENCOUNTER (OUTPATIENT)
Dept: ULTRASOUND IMAGING | Facility: HOSPITAL | Age: 33
Discharge: HOME | End: 2023-11-15
Admitting: PHYSICIAN ASSISTANT
Payer: COMMERCIAL

## 2023-11-15 ENCOUNTER — PRIOR AUTHORIZATION (OUTPATIENT)
Dept: GASTROENTEROLOGY | Facility: CLINIC | Age: 33
End: 2023-11-15
Payer: COMMERCIAL

## 2023-11-15 DIAGNOSIS — R11.2 NAUSEA AND VOMITING, UNSPECIFIED VOMITING TYPE: ICD-10-CM

## 2023-11-15 DIAGNOSIS — R10.10 UPPER ABDOMINAL PAIN: ICD-10-CM

## 2023-11-15 PROCEDURE — 76705 ECHO EXAM OF ABDOMEN: CPT

## 2023-11-15 NOTE — PROGRESS NOTES
Please let Shawnee know that her CBC, CMP, lipase, CRP are within normal limits. I will be in touch when I see her gallbladder US results.

## 2023-11-15 NOTE — PROGRESS NOTES
Please let Shawnee know that her gallbladder US does not reveal any sludge, stones or wall thickening. Proceed with HIDA as planned. Thanks!

## 2023-12-12 ENCOUNTER — OFFICE VISIT (OUTPATIENT)
Dept: FAMILY MEDICINE CLINIC | Facility: CLINIC | Age: 33
End: 2023-12-12
Payer: COMMERCIAL

## 2023-12-12 ENCOUNTER — DOCUMENTATION (OUTPATIENT)
Dept: FAMILY MEDICINE CLINIC | Facility: CLINIC | Age: 33
End: 2023-12-12

## 2023-12-12 ENCOUNTER — PATIENT MESSAGE (OUTPATIENT)
Dept: FAMILY MEDICINE CLINIC | Facility: CLINIC | Age: 33
End: 2023-12-12

## 2023-12-12 VITALS
BODY MASS INDEX: 24.52 KG/M2 | DIASTOLIC BLOOD PRESSURE: 76 MMHG | HEIGHT: 63 IN | HEART RATE: 78 BPM | WEIGHT: 138.4 LBS | SYSTOLIC BLOOD PRESSURE: 118 MMHG | OXYGEN SATURATION: 98 %

## 2023-12-12 DIAGNOSIS — E78.01 FAMILIAL HYPERCHOLESTEROLEMIA: ICD-10-CM

## 2023-12-12 DIAGNOSIS — K52.9 CHRONIC DIARRHEA: Primary | ICD-10-CM

## 2023-12-12 DIAGNOSIS — J40 BRONCHITIS: ICD-10-CM

## 2023-12-12 DIAGNOSIS — K64.9 HEMORRHOIDS, UNSPECIFIED HEMORRHOID TYPE: ICD-10-CM

## 2023-12-12 DIAGNOSIS — R79.89 LOW TSH LEVEL: Primary | ICD-10-CM

## 2023-12-12 DIAGNOSIS — J40 BRONCHITIS: Primary | ICD-10-CM

## 2023-12-12 PROCEDURE — 99204 OFFICE O/P NEW MOD 45 MIN: CPT | Performed by: FAMILY MEDICINE

## 2023-12-12 RX ORDER — HYDROCORTISONE ACETATE PRAMOXINE HCL 1; 1 G/100G; G/100G
CREAM TOPICAL 2 TIMES DAILY PRN
Qty: 28.4 G | Refills: 1 | Status: SHIPPED | OUTPATIENT
Start: 2023-12-12

## 2023-12-12 RX ORDER — CHOLESTYRAMINE 4 G/9G
1 POWDER, FOR SUSPENSION ORAL 2 TIMES DAILY PRN
Qty: 30 EACH | Refills: 5 | Status: SHIPPED | OUTPATIENT
Start: 2023-12-12

## 2023-12-12 RX ORDER — ALBUTEROL SULFATE 90 UG/1
2 AEROSOL, METERED RESPIRATORY (INHALATION) EVERY 4 HOURS PRN
Qty: 8 G | Refills: 1 | Status: SHIPPED | OUTPATIENT
Start: 2023-12-12

## 2023-12-12 NOTE — ASSESSMENT & PLAN NOTE
Lipid abnormalities are improving with lifestyle modifications.  Nutritional counseling was provided. and will check fasting lipid panel  Lipids will be reassessed in 1 year.

## 2023-12-12 NOTE — ASSESSMENT & PLAN NOTE
Will call in Proctofoam cream.  If symptoms persist or worsen, will schedule patient for follow-up evaluation for direct visualization with GI

## 2023-12-12 NOTE — PROGRESS NOTES
New Patient Office Visit      Date: 2023   Patient Name: Shawnee Walker  : 1990   MRN: [id number]     Chief Complaint:    Chief Complaint   Patient presents with    Constipation     Pt has chronic constipation and she went to the ER for it and after that she has been having diarrhea for 3 weeks    Cough     Ongoing for 3 weeks. And she states she is having some nasal congestion that started yesterday.       History of Present Illness: Shawnee Walker is a 33 y.o. female who is here today to establish care.  She currently lives at home with her  and 2 children.  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    Patient is here today for evaluation of her chronic diarrhea.  Patient states that previously she had a history of chronic constipation and was prescribed Trulance for this.  She had tried and failed Amitiza in the past.  She states that she did recently have a change in her bowel habits that lead to consistent diarrhea.  She states that her overall bowel habits will vary, however she may have multiple bowel movements in the morning and then none in the afternoon.  She reports she has not had a bowel movement today.  She does state that most of her bowel movements are loose.  Patient also has a history of hemorrhoids and she feels that the diarrhea is causing irritation of this.  She has been using over-the-counter hydrocortisone cream externally, however she feels she is having some symptoms internally as well.  She denies any mucus or blood in her stool.  She denies any family history of bowel disease.  She has been told in the past that she has irritable bowel syndrome constipation predominant.  She was seen by GI last month for this diarrhea and they recommended a workup for biliary dyskinesia.  She did undergo an ultrasound of her gallbladder last month that was overall within normal limits.  She did have an ultrasound of her gallbladder in  2017 that it showed some biliary sludge.  She was scheduled for a HIDA scan this month but due to childcare reasons did have to have this rescheduled to February.  She does notes that her diarrhea seems to occur approximately 30 minutes after eating, however at this time she has not identified any certain foods that cause flares.  She does note also that she has 2 young children that will have bouts of gastroenteritis.  She denies any fevers or abdominal pains at this time.  She does have some mild abdominal cramping before bowel movement.  Patient has had to have multiple EGDs in the past, most recent in February.  She does have chronic gastritis that is well-controlled with her Nexium.    Patient is also noticed that she is having some increased nasal congestion that started the past few days.  She states that she has had a chronic dry cough over the past 3 weeks.  She states that when she has more nasal congestion her cough will worsen.  She does have postnasal drip.  She denies any fevers.  She states that when she starts coughing her chest will feel very tight and is difficult to clear secretion.  She does have 2 children that are diagnosed with asthma.  Patient does not have her own inhaler but did use her child inhaler once it did help her symptoms.    Patient denies any family history of cancer.  Patient did have a Pap smear completed last year.  She states that her previous Pap smear was abnormal but her repeat was negative.  Her  had a vasectomy.  She is not currently on any kind of birth control.  She has never had to have a mammogram or colonoscopy.  She did have some blood work completed in the emergency room that was overall within normal limits.        Subjective      Review of Systems:   Review of Systems   HENT:  Positive for postnasal drip.    Respiratory:  Positive for cough.    Gastrointestinal:  Positive for diarrhea and rectal pain.   All other systems reviewed and are  negative.      Past Medical History:   Past Medical History:   Diagnosis Date    GERD (gastroesophageal reflux disease) 2006    Heartburn     Hyperlipidemia 2019    Irritable bowel syndrome 2010    PAC (premature atrial contraction)     has had echo in past, no current treatments.    Ulcer 2006       Past Surgical History:   Past Surgical History:   Procedure Laterality Date    UPPER GASTROINTESTINAL ENDOSCOPY  2006, 2016    WISDOM TOOTH EXTRACTION         Family History:   Family History   Problem Relation Age of Onset    Cirrhosis Father     Alcohol abuse Father        Social History:   Social History     Socioeconomic History    Marital status:    Tobacco Use    Smoking status: Never    Smokeless tobacco: Never   Vaping Use    Vaping Use: Never used   Substance and Sexual Activity    Alcohol use: Yes     Alcohol/week: 4.0 standard drinks of alcohol     Types: 2 Glasses of wine, 2 Drinks containing 0.5 oz of alcohol per week     Comment: occasionally    Drug use: No    Sexual activity: Yes     Partners: Male     Birth control/protection: None       Medications:     Current Outpatient Medications:     esomeprazole (nexIUM) 40 MG capsule, Take 1 capsule by mouth Every Morning Before Breakfast., Disp: , Rfl:     ondansetron ODT (ZOFRAN-ODT) 4 MG disintegrating tablet, Place 1 tablet on the tongue Every 8 (Eight) Hours As Needed for Nausea or Vomiting., Disp: 10 tablet, Rfl: 0    Plecanatide (Trulance) 3 MG tablet, Take 1 tablet by mouth Daily., Disp: 90 tablet, Rfl: 3    albuterol sulfate  (90 Base) MCG/ACT inhaler, Inhale 2 puffs Every 4 (Four) Hours As Needed for Wheezing or Shortness of Air., Disp: 8 g, Rfl: 1    cholestyramine (Questran) 4 g packet, Take 1 packet by mouth 2 (Two) Times a Day As Needed (diarrhea)., Disp: 30 each, Rfl: 5    Hydrocortisone Ace-Pramoxine 1-1 % rectal cream, Insert  into the rectum 2 (Two) Times a Day As Needed for Hemorrhoids., Disp: 28.4 g, Rfl: 1    Allergies:   No  "Known Allergies    Objective     Physical Exam:  Vital Signs:   Vitals:    12/12/23 1452   BP: 118/76   Pulse: 78   SpO2: 98%   Weight: 62.8 kg (138 lb 6.4 oz)   Height: 160 cm (62.99\")     Body mass index is 24.52 kg/m².  BMI is within normal parameters. No other follow-up for BMI required.       Physical Exam  Vitals and nursing note reviewed.   Constitutional:       Appearance: Normal appearance. She is normal weight.   HENT:      Head: Normocephalic and atraumatic.      Nose: Nose normal.      Mouth/Throat:      Mouth: Mucous membranes are moist.   Eyes:      Extraocular Movements: Extraocular movements intact.      Pupils: Pupils are equal, round, and reactive to light.   Cardiovascular:      Rate and Rhythm: Normal rate and regular rhythm.      Heart sounds: Normal heart sounds.   Pulmonary:      Effort: Pulmonary effort is normal.      Breath sounds: Normal breath sounds.   Abdominal:      General: Abdomen is flat. Bowel sounds are normal. There is no distension.      Palpations: Abdomen is soft.      Tenderness: There is no abdominal tenderness. There is no guarding or rebound.      Hernia: No hernia is present.   Musculoskeletal:         General: Normal range of motion.      Cervical back: Normal range of motion.   Skin:     General: Skin is warm.   Neurological:      General: No focal deficit present.      Mental Status: She is alert and oriented to person, place, and time.   Psychiatric:         Mood and Affect: Mood normal.         Behavior: Behavior normal.         Thought Content: Thought content normal.         Judgment: Judgment normal.         Procedures    Results:   PHQ-9 Total Score: 0            Assessment / Plan      Assessment/Plan:   Diagnoses and all orders for this visit:    1. Chronic diarrhea (Primary)  Assessment & Plan:  Persistent over past 3 weeks.  Patient is currently not taking Trulance.  Undergoing workup for biliary dyskinesia, HIDA scan scheduled in February.  In meantime, " recommended patient continue PPI and try a trial of cholestyramine as needed.  Patient given handout with information about gallbladder friendly diet.  Due to lack of fevers or abdominal pains, infectious cause of diarrhea is less likely at this time.  Advised patient if she does develop any of the symptoms to reach out for further testing.    Orders:  -     cholestyramine (Questran) 4 g packet; Take 1 packet by mouth 2 (Two) Times a Day As Needed (diarrhea).  Dispense: 30 each; Refill: 5    2. Hemorrhoids, unspecified hemorrhoid type  Assessment & Plan:  Will call in Proctofoam cream.  If symptoms persist or worsen, will schedule patient for follow-up evaluation for direct visualization with GI    Orders:  -     Hydrocortisone Ace-Pramoxine 1-1 % rectal cream; Insert  into the rectum 2 (Two) Times a Day As Needed for Hemorrhoids.  Dispense: 28.4 g; Refill: 1    3. Familial hypercholesterolemia  Assessment & Plan:  Lipid abnormalities are improving with lifestyle modifications.  Nutritional counseling was provided. and will check fasting lipid panel  Lipids will be reassessed in 1 year.    Orders:  -     Lipid Panel; Future    4. Bronchitis  Assessment & Plan:  Recommend patient start a daily antihistamine.  Will call in albuterol inhaler to use as needed    Orders:  -     albuterol sulfate  (90 Base) MCG/ACT inhaler; Inhale 2 puffs Every 4 (Four) Hours As Needed for Wheezing or Shortness of Air.  Dispense: 8 g; Refill: 1         Follow Up:   Return in about 1 year (around 12/12/2024), or if symptoms worsen or fail to improve, for Annual.    Flaquita Kimball,    [facility]

## 2023-12-14 ENCOUNTER — PATIENT ROUNDING (BHMG ONLY) (OUTPATIENT)
Dept: FAMILY MEDICINE CLINIC | Facility: CLINIC | Age: 33
End: 2023-12-14
Payer: COMMERCIAL

## 2023-12-16 ENCOUNTER — TELEMEDICINE (OUTPATIENT)
Dept: FAMILY MEDICINE CLINIC | Facility: TELEHEALTH | Age: 33
End: 2023-12-16
Payer: COMMERCIAL

## 2023-12-16 VITALS — BODY MASS INDEX: 24.45 KG/M2 | TEMPERATURE: 98.3 F | WEIGHT: 138 LBS | HEIGHT: 63 IN

## 2023-12-16 DIAGNOSIS — J40 BRONCHITIS: Primary | ICD-10-CM

## 2023-12-16 RX ORDER — GUAIFENESIN 600 MG/1
600 TABLET, EXTENDED RELEASE ORAL 2 TIMES DAILY
Qty: 28 TABLET | Refills: 0 | Status: SHIPPED | OUTPATIENT
Start: 2023-12-16 | End: 2023-12-30

## 2023-12-16 RX ORDER — PREDNISONE 20 MG/1
TABLET ORAL
Qty: 13 TABLET | Refills: 0 | Status: SHIPPED | OUTPATIENT
Start: 2023-12-16

## 2023-12-16 RX ORDER — DEXTROMETHORPHAN HYDROBROMIDE AND PROMETHAZINE HYDROCHLORIDE 15; 6.25 MG/5ML; MG/5ML
5 SYRUP ORAL NIGHTLY PRN
Qty: 118 ML | Refills: 0 | Status: SHIPPED | OUTPATIENT
Start: 2023-12-16

## 2023-12-16 NOTE — PROGRESS NOTES
You have chosen to receive care through a telehealth visit.  Do you consent to use a video/audio connection for your medical care today? Yes     HPI  Shawnee Walker is a 33 y.o. female  presents with complaint of wheezing, chest tightness . She reports that she was seen by  her primary care provider 12/12/2023. She was diagnosed with bronchitis and prescribed an albuterol inhaler. She feels like her symptoms are worsening but that the albuterol inhaler does help. The muscles around her chest are getting tired from coughing so much. She reports shortness of breath and wheezing. Her cough is persistent and dry throughout the visit. She has been using the albuterol inhaler and she just took ibuprofen for musculoskeletal pain. Additional symptoms that she is having are noted in the ROS portio of this visit.    Review of Systems   Constitutional:  Positive for chills (today) and fatigue. Negative for fever.   HENT:  Positive for congestion (improving), postnasal drip and rhinorrhea. Negative for sinus pressure, sinus pain, sneezing and sore throat.    Respiratory:  Positive for cough, chest tightness, shortness of breath and wheezing.         Mostly dry cough, feels mildly congested at times   Gastrointestinal:  Positive for diarrhea (ongoing baseline). Negative for nausea.   Musculoskeletal:  Positive for myalgias.        From coughing in thoracic region   Neurological:  Negative for headaches.       Past Medical History:   Diagnosis Date    GERD (gastroesophageal reflux disease) 2006    Heartburn     Hyperlipidemia 2019    Irritable bowel syndrome 2010    PAC (premature atrial contraction)     has had echo in past, no current treatments.    Ulcer 2006       Family History   Problem Relation Age of Onset    Cirrhosis Father     Alcohol abuse Father        Social History     Socioeconomic History    Marital status:    Tobacco Use    Smoking status: Never    Smokeless tobacco: Never   Vaping Use     "Vaping Use: Never used   Substance and Sexual Activity    Alcohol use: Yes     Alcohol/week: 4.0 standard drinks of alcohol     Types: 2 Glasses of wine, 2 Drinks containing 0.5 oz of alcohol per week     Comment: occasionally    Drug use: No    Sexual activity: Yes     Partners: Male     Birth control/protection: None       Shawnee Walker  reports that she has never smoked. She has never used smokeless tobacco..      Temp 98.3 °F (36.8 °C)   Ht 160 cm (63\")   Wt 62.6 kg (138 lb)   Breastfeeding No   BMI 24.45 kg/m²     PHYSICAL EXAM  Physical Exam   Constitutional: She is oriented to person, place, and time. She appears well-developed.   HENT:   Head: Normocephalic and atraumatic.   Nose: Congestion present.   Eyes: Lids are normal. Right eye exhibits no discharge. Left eye exhibits no discharge. Right conjunctiva is not injected. Left conjunctiva is not injected.   Pulmonary/Chest:  No respiratory distress (persistent dry cough at visit).  Neurological: She is alert and oriented to person, place, and time. No cranial nerve deficit.   Psychiatric: She has a normal mood and affect. Her speech is normal and behavior is normal. Judgment and thought content normal.       Results for orders placed or performed in visit on 11/14/23   C-reactive Protein    Specimen: Blood   Result Value Ref Range    C-Reactive Protein <0.30 0.00 - 0.50 mg/dL   CBC Auto Differential    Specimen: Blood   Result Value Ref Range    WBC 6.64 3.40 - 10.80 10*3/mm3    RBC 4.42 3.77 - 5.28 10*6/mm3    Hemoglobin 12.9 12.0 - 15.9 g/dL    Hematocrit 38.9 34.0 - 46.6 %    MCV 88.0 79.0 - 97.0 fL    MCH 29.2 26.6 - 33.0 pg    MCHC 33.2 31.5 - 35.7 g/dL    RDW 12.9 12.3 - 15.4 %    RDW-SD 41.2 37.0 - 54.0 fl    MPV 9.7 6.0 - 12.0 fL    Platelets 391 140 - 450 10*3/mm3    Neutrophil % 68.5 42.7 - 76.0 %    Lymphocyte % 23.8 19.6 - 45.3 %    Monocyte % 6.6 5.0 - 12.0 %    Eosinophil % 0.3 0.3 - 6.2 %    Basophil % 0.6 0.0 - 1.5 %    " Immature Grans % 0.2 0.0 - 0.5 %    Neutrophils, Absolute 4.55 1.70 - 7.00 10*3/mm3    Lymphocytes, Absolute 1.58 0.70 - 3.10 10*3/mm3    Monocytes, Absolute 0.44 0.10 - 0.90 10*3/mm3    Eosinophils, Absolute 0.02 0.00 - 0.40 10*3/mm3    Basophils, Absolute 0.04 0.00 - 0.20 10*3/mm3    Immature Grans, Absolute 0.01 0.00 - 0.05 10*3/mm3    nRBC 0.0 0.0 - 0.2 /100 WBC       Diagnoses and all orders for this visit:    1. Bronchitis (Primary)    Other orders  -     predniSONE (DELTASONE) 20 MG tablet; Prednisone 20mg tabs, 3 for 2 days, 2 for 2 days, 1 for 2 days, 1/2 for 2 days take with food or milk  Dispense: 13 tablet; Refill: 0  -     guaiFENesin (Mucinex) 600 MG 12 hr tablet; Take 1 tablet by mouth 2 (Two) Times a Day for 14 days.  Dispense: 28 tablet; Refill: 0  -     promethazine-dextromethorphan (PROMETHAZINE-DM) 6.25-15 MG/5ML syrup; Take 5 mL by mouth At Night As Needed for Cough.  Dispense: 118 mL; Refill: 0    Take prednisone with food as early in the day as possible  Do not take prednisone with nsaids such as ibuprofen, aleve, or aspirin  May take tylenol for pain or fever  Mucinex with plenty of fluids especially water to thin secretions and help with congestion.  Do not drive after taking promethazine DM as it may make you drowsy  May continue albuterol inhaler as needed and directed for shortness of breath and wheezing    FOLLOW-UP  If symptoms worsen or persist follow up with PCP, Virtual Care or Urgent Care    Patient verbalizes understanding of medication dosage, comfort measures, instructions for treatment and follow-up.    Aleksandra Stewart, APRN  12/16/2023  16:56 EST    The use of a video visit has been reviewed with the patient and verbal informed consent has been obtained. Myself and Shawnee Walker participated in this visit. The patient is located in [address] [place] [address].    I am located in [place], [place]. [employer] and [employer] were utilized. I spent 25  minutes in the patient's chart for this visit.

## 2023-12-18 ENCOUNTER — HOSPITAL ENCOUNTER (OUTPATIENT)
Dept: GENERAL RADIOLOGY | Facility: HOSPITAL | Age: 33
Discharge: HOME | End: 2023-12-18
Admitting: FAMILY MEDICINE
Payer: COMMERCIAL

## 2023-12-18 DIAGNOSIS — J40 BRONCHITIS: ICD-10-CM

## 2023-12-18 PROCEDURE — 71046 X-RAY EXAM CHEST 2 VIEWS: CPT

## 2023-12-29 ENCOUNTER — OFFICE VISIT (OUTPATIENT)
Dept: FAMILY MEDICINE CLINIC | Facility: CLINIC | Age: 33
End: 2023-12-29
Payer: COMMERCIAL

## 2023-12-29 VITALS
WEIGHT: 134.2 LBS | DIASTOLIC BLOOD PRESSURE: 70 MMHG | HEIGHT: 63 IN | HEART RATE: 93 BPM | BODY MASS INDEX: 23.78 KG/M2 | OXYGEN SATURATION: 98 % | TEMPERATURE: 97.9 F | SYSTOLIC BLOOD PRESSURE: 110 MMHG

## 2023-12-29 DIAGNOSIS — R07.81 RIB PAIN: ICD-10-CM

## 2023-12-29 DIAGNOSIS — J40 BRONCHITIS: Primary | ICD-10-CM

## 2023-12-29 RX ORDER — AZITHROMYCIN 250 MG/1
TABLET, FILM COATED ORAL
Qty: 6 TABLET | Refills: 0 | Status: SHIPPED | OUTPATIENT
Start: 2023-12-29 | End: 2024-01-03

## 2023-12-29 RX ORDER — MELOXICAM 15 MG/1
15 TABLET ORAL DAILY
Qty: 30 TABLET | Refills: 5 | Status: SHIPPED | OUTPATIENT
Start: 2023-12-29

## 2023-12-29 RX ORDER — FLUTICASONE PROPIONATE 44 UG/1
2 AEROSOL, METERED RESPIRATORY (INHALATION)
Qty: 10.6 G | Refills: 0 | Status: SHIPPED | OUTPATIENT
Start: 2023-12-29

## 2023-12-29 NOTE — ASSESSMENT & PLAN NOTE
Would like patient to start Flovent twice daily.  Discussed with patient that lets try this for 1 to 2 weeks and then cut back to once a day and see if she has had any improvement.  Would like patient to take azithromycin with her congestion for 1 month.  Continue Mucinex to help break up mucus as well.  Let us know if there is any changes in her current status.  Discussed with patient that I would like her to come back in 1 week if she is still having symptoms or develops increased shortness of breath or her symptoms worsen.  Please let us know if any of  these symptoms develop you may need to be reevaluated sooner.

## 2023-12-29 NOTE — ASSESSMENT & PLAN NOTE
Symptoms and physical exam align with a likely irritation of the costal muscles and ribs possible costochondritis from her long-term coughing.  Would like patient to try meloxicam 15 mg a day to see if this helps more with pain.  Patient's most recent kidney function was within normal limits.  Discussed with patient signs and symptoms would indicate she needs to return sooner for reevaluation.  Patient verbalized understanding and agreed to this plan.

## 2023-12-29 NOTE — PROGRESS NOTES
"    Office Note     Name: Shawnee Walker    : 1990     MRN: [id number]     Chief Complaint  Cough (Has had a consistent cough for one month. Ribcage pain from coughing so much. Was prescribed steroids and finished those on Monday. Says her rib cage is still in a lot of pain. Says the pain is all over, but it is the worst on her left side where she feels a stabbing pain. ), Nasal Congestion (Nasal congestion for a little less than a month), and URI (Chest congestion for a little less than a month)    Subjective     History of Present Illness:  Shawnee Walker is a 33 y.o. female who presents today for complaints of persistent cough and nasal congestion and chest congestion for 1 month.  Patient was seen in office by her PCP on 2023 for a cough that she has been having for 3 weeks.  At that time she complained of some nasal congestion.  She was prescribed a albuterol inhaler at that time.  The albuterol inhaler did help.  Patient was then seen again via telehealth for worsening symptoms.  She complained that the muscles around her chest are getting tired from coughing so much.  She was then prescribed oral steroids, Mucinex and promethazine DM.  She then went back and saw her primary and had a chest x-ray done on .  X-ray did not show any infectious process or abnormalities.  Her PCP stated that her cough and symptoms may be related to postviral reactive upper airway disease.  Her PCP then prescribed a inhaled steroid to try.  Patient states that she never received the prescription.  She started taking her kids Flovent twice daily and states that her wheezing has improved significantly.  Today patient still complaining of pain in her rib cage.  Patient states that her ribs are in a lot of pain, but mostly on the left side she feels a \"stabbing pain\" when she twists her body, coughs or takes very deep breaths.  She states that pressing on the area can make the pain recur.  " "Patient denies a \"nerve\" like pain.  Patient denies increased shortness of breath.  Patient states she feels like she is able to get a good deep breath, but it just causes a \"surface level\" type pain.  She states that she also carries her 1-year-old on the left side and she thinks that this does aggravate the pain.  Patient is tearful today and is tired of coughing and having this pain.  Patient states she is taking ibuprofen for the pain and that it has helped, but that the ibuprofen does wear off and she is never completely out of the discomfort.  Patient still complaining of nasal congestion and chest congestion today.        Answers submitted by the patient for this visit:  Other (Submitted on 12/29/2023)  Please describe your symptoms.: Sharp/stabbing pain to left rib with breathing, coughing, movement .  Have you had these symptoms before?: No  How long have you been having these symptoms?: 1-2 weeks  Please list any medications you are currently taking for this condition.: Ibuprofen and Tylenol  Please describe any probable cause for these symptoms. : Continued Cough for 4 weeks  Primary Reason for Visit (Submitted on 12/29/2023)  What is the primary reason for your visit?: Other      Review of Systems:   Review of Systems   Constitutional:  Negative for chills and fever.   HENT:  Positive for congestion and sinus pressure.    Respiratory:  Positive for cough and wheezing. Negative for shortness of breath.    Cardiovascular:  Negative for chest pain and palpitations.   Gastrointestinal:  Negative for abdominal pain, nausea and vomiting.   Skin:  Negative for rash.       Past Medical History:   Past Medical History:   Diagnosis Date    GERD (gastroesophageal reflux disease) 2006    Heartburn     Hyperlipidemia 2019    Irritable bowel syndrome 2010    PAC (premature atrial contraction)     has had echo in past, no current treatments.    Ulcer 2006       Past Surgical History:   Past Surgical History: "   Procedure Laterality Date    UPPER GASTROINTESTINAL ENDOSCOPY  2006, 2016    WISDOM TOOTH EXTRACTION         Family History:   Family History   Problem Relation Age of Onset    Cirrhosis Father     Alcohol abuse Father        Social History:   Social History     Socioeconomic History    Marital status:    Tobacco Use    Smoking status: Never    Smokeless tobacco: Never   Vaping Use    Vaping Use: Never used   Substance and Sexual Activity    Alcohol use: Yes     Alcohol/week: 4.0 standard drinks of alcohol     Types: 2 Glasses of wine, 2 Drinks containing 0.5 oz of alcohol per week     Comment: occasionally    Drug use: No    Sexual activity: Yes     Partners: Male     Birth control/protection: None       Immunizations:   Immunization History   Administered Date(s) Administered    31-influenza Vac Quardvalent Preservativ 09/28/2020    Flu Vaccine Intradermal Quad 18-64YR 10/02/2023    Hep B, Adolescent or Pediatric 03/02/2006, 04/28/2006, 10/09/2006    Hepatitis A 02/21/2019    MCV4 Unspecified 10/09/2006    MMR 07/16/1999    Td (TDVAX) 03/02/2006    Tdap 01/15/2009, 01/30/2020    Varicella 01/15/2009        Medications:     Current Outpatient Medications:     albuterol sulfate  (90 Base) MCG/ACT inhaler, Inhale 2 puffs Every 4 (Four) Hours As Needed for Wheezing or Shortness of Air., Disp: 8 g, Rfl: 1    cholestyramine (Questran) 4 g packet, Take 1 packet by mouth 2 (Two) Times a Day As Needed (diarrhea)., Disp: 30 each, Rfl: 5    esomeprazole (nexIUM) 40 MG capsule, Take 1 capsule by mouth Every Morning Before Breakfast., Disp: , Rfl:     guaiFENesin (Mucinex) 600 MG 12 hr tablet, Take 1 tablet by mouth 2 (Two) Times a Day for 14 days., Disp: 28 tablet, Rfl: 0    Hydrocortisone Ace-Pramoxine 1-1 % rectal cream, Insert  into the rectum 2 (Two) Times a Day As Needed for Hemorrhoids., Disp: 28.4 g, Rfl: 1    ondansetron ODT (ZOFRAN-ODT) 4 MG disintegrating tablet, Place 1 tablet on the tongue Every 8  "(Eight) Hours As Needed for Nausea or Vomiting., Disp: 10 tablet, Rfl: 0    Plecanatide (Trulance) 3 MG tablet, Take 1 tablet by mouth Daily., Disp: 90 tablet, Rfl: 3    promethazine-dextromethorphan (PROMETHAZINE-DM) 6.25-15 MG/5ML syrup, Take 5 mL by mouth At Night As Needed for Cough., Disp: 118 mL, Rfl: 0    azithromycin (ZITHROMAX) 250 MG tablet, Take 2 tablets the first day, then 1 tablet daily for 4 days., Disp: 6 tablet, Rfl: 0    fluticasone (Flovent HFA) 44 MCG/ACT inhaler, Inhale 2 puffs 2 (Two) Times a Day., Disp: 10.6 g, Rfl: 0    meloxicam (MOBIC) 15 MG tablet, Take 1 tablet by mouth Daily., Disp: 30 tablet, Rfl: 5    predniSONE (DELTASONE) 20 MG tablet, Prednisone 20mg tabs, 3 for 2 days, 2 for 2 days, 1 for 2 days, 1/2 for 2 days take with food or milk, Disp: 13 tablet, Rfl: 0    Allergies:   No Known Allergies    Objective     Vital Signs  /70   Pulse 93   Temp 97.9 °F (36.6 °C) (Oral)   Ht 160 cm (62.99\")   Wt 60.9 kg (134 lb 3.2 oz)   SpO2 98%   BMI 23.78 kg/m²   Estimated body mass index is 23.78 kg/m² as calculated from the following:    Height as of this encounter: 160 cm (62.99\").    Weight as of this encounter: 60.9 kg (134 lb 3.2 oz).    BMI is within normal parameters. No other follow-up for BMI required.       Physical Exam  Constitutional:       Appearance: Normal appearance.   HENT:      Head: Normocephalic and atraumatic.      Nose: Nose normal. Congestion present.      Mouth/Throat:      Mouth: Mucous membranes are moist.      Pharynx: No posterior oropharyngeal erythema.   Eyes:      Extraocular Movements: Extraocular movements intact.      Pupils: Pupils are equal, round, and reactive to light.   Cardiovascular:      Rate and Rhythm: Normal rate and regular rhythm.      Pulses: Normal pulses.      Heart sounds: Normal heart sounds.   Pulmonary:      Effort: Pulmonary effort is normal. No respiratory distress.      Breath sounds: Normal breath sounds and air entry. No " wheezing or rales.      Comments: Tenderness to palpation under left breast along the rib cage near ninth and 8 and 9th ribs.   Chest:      Chest wall: Tenderness present.   Abdominal:      General: Abdomen is flat. Bowel sounds are normal.      Tenderness: There is no abdominal tenderness.   Musculoskeletal:         General: Normal range of motion.      Cervical back: Normal range of motion.   Lymphadenopathy:      Cervical: No cervical adenopathy.   Skin:     General: Skin is warm.      Capillary Refill: Capillary refill takes less than 2 seconds.      Comments: No rash present.   Neurological:      General: No focal deficit present.      Mental Status: She is alert and oriented to person, place, and time.   Psychiatric:         Mood and Affect: Mood normal.            Procedures     Results:  No results found for this or any previous visit (from the past 24 hour(s)).     Assessment and Plan     Assessment/Plan:  Diagnoses and all orders for this visit:    1. Bronchitis (Primary)  Assessment & Plan:  Would like patient to start Flovent twice daily.  Discussed with patient that lets try this for 1 to 2 weeks and then cut back to once a day and see if she has had any improvement.  Would like patient to take azithromycin with her congestion for 1 month.  Continue Mucinex to help break up mucus as well.  Let us know if there is any changes in her current status.  Discussed with patient that I would like her to come back in 1 week if she is still having symptoms or develops increased shortness of breath or her symptoms worsen.  Please let us know if any of  these symptoms develop you may need to be reevaluated sooner.    Orders:  -     fluticasone (Flovent HFA) 44 MCG/ACT inhaler; Inhale 2 puffs 2 (Two) Times a Day.  Dispense: 10.6 g; Refill: 0  -     azithromycin (ZITHROMAX) 250 MG tablet; Take 2 tablets the first day, then 1 tablet daily for 4 days.  Dispense: 6 tablet; Refill: 0    2. Rib pain  Assessment &  Plan:  Symptoms and physical exam align with a likely irritation of the costal muscles and ribs possible costochondritis from her long-term coughing.  Would like patient to try meloxicam 15 mg a day to see if this helps more with pain.  Patient's most recent kidney function was within normal limits.  Discussed with patient signs and symptoms would indicate she needs to return sooner for reevaluation.  Patient verbalized understanding and agreed to this plan.    Orders:  -     meloxicam (MOBIC) 15 MG tablet; Take 1 tablet by mouth Daily.  Dispense: 30 tablet; Refill: 5        Follow Up  Return in about 1 week (around 1/5/2024).    Tiff Justice PA-C   [facility]

## 2024-01-05 ENCOUNTER — OFFICE VISIT (OUTPATIENT)
Dept: FAMILY MEDICINE CLINIC | Facility: CLINIC | Age: 34
End: 2024-01-05
Payer: COMMERCIAL

## 2024-01-05 VITALS
OXYGEN SATURATION: 98 % | SYSTOLIC BLOOD PRESSURE: 108 MMHG | HEART RATE: 63 BPM | BODY MASS INDEX: 24.2 KG/M2 | HEIGHT: 63 IN | DIASTOLIC BLOOD PRESSURE: 70 MMHG | WEIGHT: 136.6 LBS

## 2024-01-05 DIAGNOSIS — K58.1 IRRITABLE BOWEL SYNDROME WITH CONSTIPATION: Primary | ICD-10-CM

## 2024-01-05 DIAGNOSIS — J06.9 VIRAL URI WITH COUGH: ICD-10-CM

## 2024-01-05 DIAGNOSIS — M94.0 COSTOCHONDRITIS: ICD-10-CM

## 2024-01-05 PROCEDURE — 99213 OFFICE O/P EST LOW 20 MIN: CPT | Performed by: FAMILY MEDICINE

## 2024-01-05 NOTE — ASSESSMENT & PLAN NOTE
Unfortunately patient has had persistent symptoms.  Continue supportive care with increasing fluid intake, albuterol as needed, Flovent will wean as able.  Start 1 spoonful honey twice daily.  If symptoms continue to persist past 3 months, would recommend pulmonology evaluation.

## 2024-01-05 NOTE — PROGRESS NOTES
Office Note     Name: Shawnee Walker    : 1990     MRN: [id number]     Chief Complaint  Pain (Pt states she is here for a follow up with pain on both sides of her ribs.)    Subjective     History of Present Illness:  Shawnee Walker is a 33 y.o. female who presents today for follow-up.    Patient is here today for re-evaluation of her chronic diarrhea.  Patient states that previously she had a history of chronic constipation and was prescribed Trulance for this.  She had tried and failed Amitiza in the past.  Unfortunately, patient found that Trulance was going to be around $200 per month.  She is interested in trying an alternative medication.  Patient has had to have multiple EGDs in the past, most recent in February.  She does have chronic gastritis that is well-controlled with her Nexium.     Patient is also here today to follow-up on her viral upper respiratory infection.  Patient was first diagnosed at the beginning of December with a viral respiratory illness.  Unfortunately, patient has developed a persistent cough since this infection.  Patient is also suffered from costochondritis.  She was evaluated at this clinic and started on meloxicam which has improved her symptoms.  She is still taking meloxicam once per day but is nervous about this due to her above chronic gastritis.  She was prescribed albuterol as needed and Flovent 2 puffs twice daily.  She continues to have some congestion in her chest, and has been taking a cough syrup at night that does lead to some drowsiness.  She has not had a evaluation for adult onset asthma.  She did undergo a chest x-ray around a month ago that showed no acute process.    Review of Systems:   Review of Systems   Respiratory:  Positive for cough and chest tightness.    Gastrointestinal:  Positive for constipation.   All other systems reviewed and are negative.      Past Medical History:   Past Medical History:   Diagnosis Date    GERD  (gastroesophageal reflux disease) 2006    Heartburn     Hyperlipidemia 2019    Irritable bowel syndrome 2010    PAC (premature atrial contraction)     has had echo in past, no current treatments.    Ulcer 2006       Past Surgical History:   Past Surgical History:   Procedure Laterality Date    UPPER GASTROINTESTINAL ENDOSCOPY  2006, 2016    WISDOM TOOTH EXTRACTION         Family History:   Family History   Problem Relation Age of Onset    Cirrhosis Father     Alcohol abuse Father        Social History:   Social History     Socioeconomic History    Marital status:    Tobacco Use    Smoking status: Never    Smokeless tobacco: Never   Vaping Use    Vaping Use: Never used   Substance and Sexual Activity    Alcohol use: Yes     Alcohol/week: 4.0 standard drinks of alcohol     Types: 2 Glasses of wine, 2 Drinks containing 0.5 oz of alcohol per week     Comment: occasionally    Drug use: No    Sexual activity: Yes     Partners: Male     Birth control/protection: None       Immunizations:   Immunization History   Administered Date(s) Administered    31-influenza Vac Quardvalent Preservativ 09/28/2020    Flu Vaccine Intradermal Quad 18-64YR 10/02/2023    Hep B, Adolescent or Pediatric 03/02/2006, 04/28/2006, 10/09/2006    Hepatitis A 02/21/2019    MCV4 Unspecified 10/09/2006    MMR 07/16/1999    Td (TDVAX) 03/02/2006    Tdap 01/15/2009, 01/30/2020    Varicella 01/15/2009        Medications:     Current Outpatient Medications:     albuterol sulfate  (90 Base) MCG/ACT inhaler, Inhale 2 puffs Every 4 (Four) Hours As Needed for Wheezing or Shortness of Air., Disp: 8 g, Rfl: 1    cholestyramine (Questran) 4 g packet, Take 1 packet by mouth 2 (Two) Times a Day As Needed (diarrhea)., Disp: 30 each, Rfl: 5    esomeprazole (nexIUM) 40 MG capsule, Take 1 capsule by mouth Every Morning Before Breakfast., Disp: , Rfl:     fluticasone (Flovent HFA) 44 MCG/ACT inhaler, Inhale 2 puffs 2 (Two) Times a Day., Disp: 10.6 g, Rfl:  "0    Hydrocortisone Ace-Pramoxine 1-1 % rectal cream, Insert  into the rectum 2 (Two) Times a Day As Needed for Hemorrhoids., Disp: 28.4 g, Rfl: 1    meloxicam (MOBIC) 15 MG tablet, Take 1 tablet by mouth Daily., Disp: 30 tablet, Rfl: 5    ondansetron ODT (ZOFRAN-ODT) 4 MG disintegrating tablet, Place 1 tablet on the tongue Every 8 (Eight) Hours As Needed for Nausea or Vomiting., Disp: 10 tablet, Rfl: 0    Plecanatide (Trulance) 3 MG tablet, Take 1 tablet by mouth Daily., Disp: 90 tablet, Rfl: 3    promethazine-dextromethorphan (PROMETHAZINE-DM) 6.25-15 MG/5ML syrup, Take 5 mL by mouth At Night As Needed for Cough., Disp: 118 mL, Rfl: 0    Allergies:   No Known Allergies    Objective     Vital Signs  /70   Pulse 63   Ht 160 cm (62.99\")   Wt 62 kg (136 lb 9.6 oz)   SpO2 98%   BMI 24.20 kg/m²   Estimated body mass index is 24.2 kg/m² as calculated from the following:    Height as of this encounter: 160 cm (62.99\").    Weight as of this encounter: 62 kg (136 lb 9.6 oz).    BMI is within normal parameters. No other follow-up for BMI required.       Physical Exam  Vitals and nursing note reviewed.   Constitutional:       Appearance: Normal appearance. She is normal weight.   HENT:      Head: Normocephalic and atraumatic.      Nose: Nose normal.      Mouth/Throat:      Mouth: Mucous membranes are moist.   Eyes:      Extraocular Movements: Extraocular movements intact.      Pupils: Pupils are equal, round, and reactive to light.   Cardiovascular:      Rate and Rhythm: Normal rate and regular rhythm.      Heart sounds: Normal heart sounds.   Pulmonary:      Effort: Pulmonary effort is normal.      Breath sounds: Normal breath sounds.   Abdominal:      General: Abdomen is flat.   Musculoskeletal:         General: Normal range of motion.      Cervical back: Normal range of motion.   Skin:     General: Skin is warm.   Neurological:      General: No focal deficit present.      Mental Status: She is alert and oriented " to person, place, and time.   Psychiatric:         Mood and Affect: Mood normal.         Behavior: Behavior normal.         Thought Content: Thought content normal.         Judgment: Judgment normal.         Procedures     Results:  No results found for this or any previous visit (from the past 24 hour(s)).     Assessment and Plan     Assessment/Plan:  Diagnoses and all orders for this visit:    1. Irritable bowel syndrome with constipation (Primary)  Assessment & Plan:  Samples given for Linzess, patient plans to discuss with GI if this may be a cheaper alternative.      2. Viral URI with cough  Assessment & Plan:  Unfortunately patient has had persistent symptoms.  Continue supportive care with increasing fluid intake, albuterol as needed, Flovent will wean as able.  Start 1 spoonful honey twice daily.  If symptoms continue to persist past 3 months, would recommend pulmonology evaluation.      3. Costochondritis  Assessment & Plan:  Improving symptoms.  Will wean NSAIDs as able.          Follow Up  Return in about 3 months (around 4/5/2024), or if symptoms worsen or fail to improve.    Flaquita Kimball, DO   [facility]

## 2024-02-19 ENCOUNTER — HOSPITAL ENCOUNTER (OUTPATIENT)
Dept: NUCLEAR MEDICINE | Facility: HOSPITAL | Age: 34
Discharge: HOME | End: 2024-02-19
Payer: COMMERCIAL

## 2024-02-19 VITALS — WEIGHT: 140 LBS | BODY MASS INDEX: 24.81 KG/M2

## 2024-02-19 DIAGNOSIS — R10.10 UPPER ABDOMINAL PAIN: ICD-10-CM

## 2024-02-19 DIAGNOSIS — R11.2 NAUSEA AND VOMITING, UNSPECIFIED VOMITING TYPE: ICD-10-CM

## 2024-02-19 DIAGNOSIS — K82.8 BILIARY DYSKINESIA: Primary | ICD-10-CM

## 2024-02-19 PROCEDURE — 0 TECHNETIUM TC 99M MEBROFENIN KIT: Performed by: PHYSICIAN ASSISTANT

## 2024-02-19 PROCEDURE — 78227 HEPATOBIL SYST IMAGE W/DRUG: CPT

## 2024-02-19 PROCEDURE — A9537 TC99M MEBROFENIN: HCPCS | Performed by: PHYSICIAN ASSISTANT

## 2024-02-19 PROCEDURE — 25010000002 SINCALIDE PER 5 MCG: Performed by: PHYSICIAN ASSISTANT

## 2024-02-19 RX ORDER — KIT FOR THE PREPARATION OF TECHNETIUM TC 99M MEBROFENIN 45 MG/10ML
1 INJECTION, POWDER, LYOPHILIZED, FOR SOLUTION INTRAVENOUS
Status: COMPLETED | OUTPATIENT
Start: 2024-02-19 | End: 2024-02-19

## 2024-02-19 RX ORDER — SINCALIDE 5 UG/5ML
0.02 INJECTION, POWDER, LYOPHILIZED, FOR SOLUTION INTRAVENOUS ONCE
Status: COMPLETED | OUTPATIENT
Start: 2024-02-19 | End: 2024-02-19

## 2024-02-19 RX ADMIN — MEBROFENIN 1 DOSE: 45 INJECTION, POWDER, LYOPHILIZED, FOR SOLUTION INTRAVENOUS at 08:47

## 2024-02-19 RX ADMIN — SINCALIDE 1.3 MCG: 5 INJECTION, POWDER, LYOPHILIZED, FOR SOLUTION INTRAVENOUS at 09:40

## 2024-02-19 NOTE — PROGRESS NOTES
Please let Shawnee know that her HIDA reveals gallbladder ejection fraction of 8%, indicating very poor gallbladder function. I will place a referral to general surgery to discuss elective cholecystectomy.

## 2024-03-26 ENCOUNTER — OFFICE VISIT (OUTPATIENT)
Dept: FAMILY MEDICINE CLINIC | Facility: CLINIC | Age: 34
End: 2024-03-26
Payer: COMMERCIAL

## 2024-03-26 VITALS
OXYGEN SATURATION: 99 % | TEMPERATURE: 98.4 F | DIASTOLIC BLOOD PRESSURE: 72 MMHG | HEART RATE: 96 BPM | HEIGHT: 63 IN | SYSTOLIC BLOOD PRESSURE: 118 MMHG | BODY MASS INDEX: 24.13 KG/M2 | WEIGHT: 136.2 LBS

## 2024-03-26 DIAGNOSIS — R07.81 RIB PAIN: ICD-10-CM

## 2024-03-26 DIAGNOSIS — J01.00 ACUTE NON-RECURRENT MAXILLARY SINUSITIS: Primary | ICD-10-CM

## 2024-03-26 DIAGNOSIS — K82.8 BILIARY DYSKINESIA: ICD-10-CM

## 2024-03-26 PROBLEM — J06.9 VIRAL URI WITH COUGH: Status: RESOLVED | Noted: 2024-01-05 | Resolved: 2024-03-26

## 2024-03-26 PROCEDURE — 99214 OFFICE O/P EST MOD 30 MIN: CPT | Performed by: FAMILY MEDICINE

## 2024-03-26 RX ORDER — FLUTICASONE PROPIONATE 50 MCG
1 SPRAY, SUSPENSION (ML) NASAL DAILY
Qty: 9.9 ML | Refills: 11 | Status: SHIPPED | OUTPATIENT
Start: 2024-03-26

## 2024-03-26 RX ORDER — AMOXICILLIN AND CLAVULANATE POTASSIUM 875; 125 MG/1; MG/1
1 TABLET, FILM COATED ORAL 2 TIMES DAILY
Qty: 14 TABLET | Refills: 0 | Status: SHIPPED | OUTPATIENT
Start: 2024-03-26 | End: 2024-04-02

## 2024-03-26 NOTE — ASSESSMENT & PLAN NOTE
Recommend Augmentin twice daily for 7 days. Recommended supportive care, adequate hydration, and stressed the importance of healthy hygiene habits to avoid spread (I.e. hand washing, social distancing) Advised patient to call clinic if they develop fever or if symptoms worsen/persist.  Recommend Zyrtec and Flonase

## 2024-03-26 NOTE — ASSESSMENT & PLAN NOTE
Differential currently includes pleurisy versus irritation of the diaphragm or the gallbladder due to known gallbladder dysfunction.  Recommend NSAIDs as needed.

## 2024-03-26 NOTE — PROGRESS NOTES
Office Note     Name: Shawnee Walker    : 1990     MRN: [id number]     Chief Complaint  Sinus Problem (Burning in her face and congestion and facial pain) and Rib Pain (Worse on the right side more than the left)    Subjective     History of Present Illness:  Shawnee Walker is a 33 y.o. female who presents today for evaluation of sinus issue.    Patient reports that last week one of her coworkers tested positive for the flu.  She states shortly after exposure she did develop symptoms of myalgias, fever, and mid upper respiratory infection.  She states her symptoms were mild and she did not undergo further testing.  She is overall been resting and taking Zyrtec as needed.  Patient states over the past few days she has developed a burning in her bilateral face, significant congestion and facial pain.  She denies any fevers at this time.  She has also noticed that she is having some increased right rib pain.  She did get diagnosed with pleurisy in the past after viral illness.  She is also recently diagnosed with biliary dyskinesia with a very low ejection fraction of the gallbladder.  She is scheduled for an evaluation with [facility] general surgery.    Review of Systems:   Review of Systems   HENT:  Positive for congestion and sinus pressure.    Cardiovascular:  Positive for chest pain.   All other systems reviewed and are negative.      Past Medical History:   Past Medical History:   Diagnosis Date    GERD (gastroesophageal reflux disease)     Heartburn     Hyperlipidemia 2019    Irritable bowel syndrome     PAC (premature atrial contraction)     has had echo in past, no current treatments.    Ulcer 2006       Past Surgical History:   Past Surgical History:   Procedure Laterality Date    UPPER GASTROINTESTINAL ENDOSCOPY  , 2016    WISDOM TOOTH EXTRACTION         Family History:   Family History   Problem Relation Age of Onset    Cirrhosis Father     Alcohol abuse Father         Social History:   Social History     Socioeconomic History    Marital status:    Tobacco Use    Smoking status: Never    Smokeless tobacco: Never   Vaping Use    Vaping status: Never Used   Substance and Sexual Activity    Alcohol use: Yes     Alcohol/week: 4.0 standard drinks of alcohol     Types: 2 Glasses of wine, 2 Drinks containing 0.5 oz of alcohol per week     Comment: occasionally    Drug use: No    Sexual activity: Yes     Partners: Male     Birth control/protection: None       Immunizations:   Immunization History   Administered Date(s) Administered    31-influenza Vac Quardvalent Preservativ 09/28/2020    Flu Vaccine Intradermal Quad 18-64YR 10/02/2023    Hep B, Adolescent or Pediatric 03/02/2006, 04/28/2006, 10/09/2006    Hepatitis A 02/21/2019    MCV4 Unspecified 10/09/2006    MMR 07/16/1999    Td (TDVAX) 03/02/2006    Tdap 01/15/2009, 01/30/2020    Varicella 01/15/2009        Medications:     Current Outpatient Medications:     albuterol sulfate  (90 Base) MCG/ACT inhaler, Inhale 2 puffs Every 4 (Four) Hours As Needed for Wheezing or Shortness of Air., Disp: 8 g, Rfl: 1    cholestyramine (Questran) 4 g packet, Take 1 packet by mouth 2 (Two) Times a Day As Needed (diarrhea)., Disp: 30 each, Rfl: 5    esomeprazole (nexIUM) 40 MG capsule, Take 1 capsule by mouth Every Morning Before Breakfast., Disp: , Rfl:     fluticasone (Flovent HFA) 44 MCG/ACT inhaler, Inhale 2 puffs 2 (Two) Times a Day., Disp: 10.6 g, Rfl: 0    Hydrocortisone Ace-Pramoxine 1-1 % rectal cream, Insert  into the rectum 2 (Two) Times a Day As Needed for Hemorrhoids., Disp: 28.4 g, Rfl: 1    meloxicam (MOBIC) 15 MG tablet, Take 1 tablet by mouth Daily., Disp: 30 tablet, Rfl: 5    ondansetron ODT (ZOFRAN-ODT) 4 MG disintegrating tablet, Place 1 tablet on the tongue Every 8 (Eight) Hours As Needed for Nausea or Vomiting., Disp: 10 tablet, Rfl: 0    Plecanatide (Trulance) 3 MG tablet, Take 1 tablet by mouth Daily., Disp:  "90 tablet, Rfl: 3    promethazine-dextromethorphan (PROMETHAZINE-DM) 6.25-15 MG/5ML syrup, Take 5 mL by mouth At Night As Needed for Cough., Disp: 118 mL, Rfl: 0    amoxicillin-clavulanate (AUGMENTIN) 875-125 MG per tablet, Take 1 tablet by mouth 2 (Two) Times a Day for 7 days., Disp: 14 tablet, Rfl: 0    fluticasone (FLONASE) 50 MCG/ACT nasal spray, 1 spray into the nostril(s) as directed by provider Daily., Disp: 9.9 mL, Rfl: 11    Allergies:   No Known Allergies    Objective     Vital Signs  /72   Pulse 96   Temp 98.4 °F (36.9 °C)   Ht 160 cm (62.99\")   Wt 61.8 kg (136 lb 3.2 oz)   SpO2 99%   BMI 24.13 kg/m²   Estimated body mass index is 24.13 kg/m² as calculated from the following:    Height as of this encounter: 160 cm (62.99\").    Weight as of this encounter: 61.8 kg (136 lb 3.2 oz).    BMI is within normal parameters. No other follow-up for BMI required.       Physical Exam  Vitals reviewed.   Constitutional:       Appearance: Normal appearance. She is normal weight.   HENT:      Head: Normocephalic and atraumatic.      Right Ear: Tympanic membrane and external ear normal. A middle ear effusion is present.      Left Ear: Tympanic membrane, ear canal and external ear normal. A middle ear effusion is present.      Nose: Nose normal.      Right Sinus: Maxillary sinus tenderness present.      Left Sinus: Maxillary sinus tenderness present.      Mouth/Throat:      Mouth: Mucous membranes are moist.   Eyes:      Extraocular Movements: Extraocular movements intact.      Pupils: Pupils are equal, round, and reactive to light.   Cardiovascular:      Rate and Rhythm: Normal rate and regular rhythm.      Heart sounds: Normal heart sounds.   Pulmonary:      Effort: Pulmonary effort is normal.      Breath sounds: Normal breath sounds.   Abdominal:      General: Abdomen is flat.   Musculoskeletal:         General: Normal range of motion.      Cervical back: Normal range of motion.   Skin:     General: Skin is " warm.   Neurological:      General: No focal deficit present.      Mental Status: She is alert and oriented to person, place, and time.   Psychiatric:         Mood and Affect: Mood normal.         Behavior: Behavior normal.         Thought Content: Thought content normal.         Judgment: Judgment normal.            Procedures     Results:  No results found for this or any previous visit (from the past 24 hour(s)).     Assessment and Plan     Assessment/Plan:  Diagnoses and all orders for this visit:    1. Acute non-recurrent maxillary sinusitis (Primary)  Assessment & Plan:  Recommend Augmentin twice daily for 7 days. Recommended supportive care, adequate hydration, and stressed the importance of healthy hygiene habits to avoid spread (I.e. hand washing, social distancing) Advised patient to call clinic if they develop fever or if symptoms worsen/persist.  Recommend Zyrtec and Flonase    Orders:  -     amoxicillin-clavulanate (AUGMENTIN) 875-125 MG per tablet; Take 1 tablet by mouth 2 (Two) Times a Day for 7 days.  Dispense: 14 tablet; Refill: 0  -     fluticasone (FLONASE) 50 MCG/ACT nasal spray; 1 spray into the nostril(s) as directed by provider Daily.  Dispense: 9.9 mL; Refill: 11    2. Rib pain  Assessment & Plan:  [place] currently includes pleurisy versus irritation of the diaphragm or the gallbladder due to known gallbladder dysfunction.  Recommend NSAIDs as needed.      3. Biliary dyskinesia  Assessment & Plan:  Follow-up with general surgery as scheduled          Follow Up  Return if symptoms worsen or fail to improve.    Flaquita Kimball DO   [facility]

## 2024-04-19 ENCOUNTER — APPOINTMENT (OUTPATIENT)
Dept: GENERAL RADIOLOGY | Facility: HOSPITAL | Age: 34
End: 2024-04-19
Payer: COMMERCIAL

## 2024-04-19 ENCOUNTER — HOSPITAL ENCOUNTER (EMERGENCY)
Facility: HOSPITAL | Age: 34
Discharge: HOME | End: 2024-04-19
Attending: EMERGENCY MEDICINE
Payer: COMMERCIAL

## 2024-04-19 ENCOUNTER — APPOINTMENT (OUTPATIENT)
Dept: CT IMAGING | Facility: HOSPITAL | Age: 34
End: 2024-04-19
Payer: COMMERCIAL

## 2024-04-19 VITALS
TEMPERATURE: 98.3 F | OXYGEN SATURATION: 100 % | SYSTOLIC BLOOD PRESSURE: 130 MMHG | HEART RATE: 84 BPM | BODY MASS INDEX: 23.92 KG/M2 | DIASTOLIC BLOOD PRESSURE: 81 MMHG | RESPIRATION RATE: 16 BRPM | HEIGHT: 63 IN | WEIGHT: 135 LBS

## 2024-04-19 DIAGNOSIS — K76.9 LIVER LESION: ICD-10-CM

## 2024-04-19 DIAGNOSIS — R07.9 CHEST PAIN, UNSPECIFIED TYPE: ICD-10-CM

## 2024-04-19 DIAGNOSIS — K92.1 HEMATOCHEZIA: ICD-10-CM

## 2024-04-19 DIAGNOSIS — R00.2 PALPITATIONS: ICD-10-CM

## 2024-04-19 DIAGNOSIS — R10.9 ACUTE ABDOMINAL PAIN: Primary | ICD-10-CM

## 2024-04-19 LAB
ALBUMIN SERPL-MCNC: 4.6 G/DL (ref 3.5–5.2)
ALBUMIN/GLOB SERPL: 1.5 G/DL
ALP SERPL-CCNC: 54 U/L (ref 39–117)
ALT SERPL W P-5'-P-CCNC: 8 U/L (ref 1–33)
ANION GAP SERPL CALCULATED.3IONS-SCNC: 16 MMOL/L (ref 5–15)
AST SERPL-CCNC: 16 U/L (ref 1–32)
B-HCG UR QL: NEGATIVE
BASOPHILS # BLD AUTO: 0.06 10*3/MM3 (ref 0–0.2)
BASOPHILS NFR BLD AUTO: 0.7 % (ref 0–1.5)
BILIRUB SERPL-MCNC: 0.7 MG/DL (ref 0–1.2)
BILIRUB UR QL STRIP: NEGATIVE
BUN SERPL-MCNC: 12 MG/DL (ref 6–20)
BUN/CREAT SERPL: 18.2 (ref 7–25)
CALCIUM SPEC-SCNC: 9.1 MG/DL (ref 8.6–10.5)
CHLORIDE SERPL-SCNC: 102 MMOL/L (ref 98–107)
CLARITY UR: CLEAR
CO2 SERPL-SCNC: 23 MMOL/L (ref 22–29)
COLOR UR: YELLOW
CREAT SERPL-MCNC: 0.66 MG/DL (ref 0.57–1)
D DIMER PPP FEU-MCNC: <0.27 MCGFEU/ML (ref 0–0.5)
DEPRECATED RDW RBC AUTO: 42.6 FL (ref 37–54)
EGFRCR SERPLBLD CKD-EPI 2021: 117.5 ML/MIN/1.73
EOSINOPHIL # BLD AUTO: 0.14 10*3/MM3 (ref 0–0.4)
EOSINOPHIL NFR BLD AUTO: 1.7 % (ref 0.3–6.2)
ERYTHROCYTE [DISTWIDTH] IN BLOOD BY AUTOMATED COUNT: 12.8 % (ref 12.3–15.4)
GEN 5 2HR TROPONIN T REFLEX: 9 NG/L
GLOBULIN UR ELPH-MCNC: 3.1 GM/DL
GLUCOSE SERPL-MCNC: 113 MG/DL (ref 65–99)
GLUCOSE UR STRIP-MCNC: NEGATIVE MG/DL
HCT VFR BLD AUTO: 39.6 % (ref 34–46.6)
HGB BLD-MCNC: 12.8 G/DL (ref 12–15.9)
HGB UR QL STRIP.AUTO: NEGATIVE
HOLD SPECIMEN: NORMAL
IMM GRANULOCYTES # BLD AUTO: 0.03 10*3/MM3 (ref 0–0.05)
IMM GRANULOCYTES NFR BLD AUTO: 0.4 % (ref 0–0.5)
KETONES UR QL STRIP: NEGATIVE
LEUKOCYTE ESTERASE UR QL STRIP.AUTO: NEGATIVE
LIPASE SERPL-CCNC: 37 U/L (ref 13–60)
LYMPHOCYTES # BLD AUTO: 1.66 10*3/MM3 (ref 0.7–3.1)
LYMPHOCYTES NFR BLD AUTO: 20.3 % (ref 19.6–45.3)
MCH RBC QN AUTO: 29.6 PG (ref 26.6–33)
MCHC RBC AUTO-ENTMCNC: 32.3 G/DL (ref 31.5–35.7)
MCV RBC AUTO: 91.5 FL (ref 79–97)
MONOCYTES # BLD AUTO: 0.32 10*3/MM3 (ref 0.1–0.9)
MONOCYTES NFR BLD AUTO: 3.9 % (ref 5–12)
NEUTROPHILS NFR BLD AUTO: 5.95 10*3/MM3 (ref 1.7–7)
NEUTROPHILS NFR BLD AUTO: 73 % (ref 42.7–76)
NITRITE UR QL STRIP: NEGATIVE
NRBC BLD AUTO-RTO: 0 /100 WBC (ref 0–0.2)
NT-PROBNP SERPL-MCNC: 44 PG/ML (ref 0–450)
PH UR STRIP.AUTO: 6.5 [PH] (ref 5–8)
PLATELET # BLD AUTO: 342 10*3/MM3 (ref 140–450)
PMV BLD AUTO: 9.4 FL (ref 6–12)
POTASSIUM SERPL-SCNC: 3.9 MMOL/L (ref 3.5–5.2)
PROT SERPL-MCNC: 7.7 G/DL (ref 6–8.5)
PROT UR QL STRIP: NEGATIVE
RBC # BLD AUTO: 4.33 10*6/MM3 (ref 3.77–5.28)
SODIUM SERPL-SCNC: 141 MMOL/L (ref 136–145)
SP GR UR STRIP: 1.01 (ref 1–1.03)
TROPONIN T DELTA: NORMAL
TROPONIN T SERPL HS-MCNC: <6 NG/L
TSH SERPL DL<=0.05 MIU/L-ACNC: 0.69 UIU/ML (ref 0.27–4.2)
UROBILINOGEN UR QL STRIP: NORMAL
WBC NRBC COR # BLD AUTO: 8.16 10*3/MM3 (ref 3.4–10.8)
WHOLE BLOOD HOLD COAG: NORMAL
WHOLE BLOOD HOLD SPECIMEN: NORMAL

## 2024-04-19 PROCEDURE — 84484 ASSAY OF TROPONIN QUANT: CPT | Performed by: EMERGENCY MEDICINE

## 2024-04-19 PROCEDURE — 36415 COLL VENOUS BLD VENIPUNCTURE: CPT

## 2024-04-19 PROCEDURE — 74176 CT ABD & PELVIS W/O CONTRAST: CPT

## 2024-04-19 PROCEDURE — 93005 ELECTROCARDIOGRAM TRACING: CPT | Performed by: EMERGENCY MEDICINE

## 2024-04-19 PROCEDURE — 83690 ASSAY OF LIPASE: CPT | Performed by: EMERGENCY MEDICINE

## 2024-04-19 PROCEDURE — 99284 EMERGENCY DEPT VISIT MOD MDM: CPT

## 2024-04-19 PROCEDURE — 71045 X-RAY EXAM CHEST 1 VIEW: CPT

## 2024-04-19 PROCEDURE — 80050 GENERAL HEALTH PANEL: CPT | Performed by: EMERGENCY MEDICINE

## 2024-04-19 PROCEDURE — 83880 ASSAY OF NATRIURETIC PEPTIDE: CPT | Performed by: EMERGENCY MEDICINE

## 2024-04-19 PROCEDURE — 85379 FIBRIN DEGRADATION QUANT: CPT | Performed by: EMERGENCY MEDICINE

## 2024-04-19 PROCEDURE — 81025 URINE PREGNANCY TEST: CPT | Performed by: EMERGENCY MEDICINE

## 2024-04-19 PROCEDURE — 81003 URINALYSIS AUTO W/O SCOPE: CPT | Performed by: EMERGENCY MEDICINE

## 2024-04-19 PROCEDURE — 93005 ELECTROCARDIOGRAM TRACING: CPT

## 2024-04-19 RX ORDER — ASPIRIN 81 MG/1
324 TABLET, CHEWABLE ORAL ONCE
Status: COMPLETED | OUTPATIENT
Start: 2024-04-19 | End: 2024-04-19

## 2024-04-19 RX ORDER — SODIUM CHLORIDE 0.9 % (FLUSH) 0.9 %
10 SYRINGE (ML) INJECTION AS NEEDED
Status: DISCONTINUED | OUTPATIENT
Start: 2024-04-19 | End: 2024-04-19 | Stop reason: HOSPADM

## 2024-04-19 RX ADMIN — ASPIRIN 324 MG: 81 TABLET, CHEWABLE ORAL at 11:57

## 2024-04-19 NOTE — Clinical Note
[facility] EMERGENCY DEPARTMENT  [address]  [facility] [address]  Phone: [phone]    Shawnee Walker was seen and treated in our emergency department on 4/19/2024.  She may return to work on 04/20/2024.         Thank you for choosing [facility].    Stephen Ventura MD

## 2024-04-19 NOTE — ED PROVIDER NOTES
Subjective   History of Present Illness  Mrs Walker presents with an episode occurring at work today.  She tells me she was standing when she developed a pressure in her chest.  She then noted that she felt flushed and weak.  Her heart rate increased up in the 120s and has been fluctuating since then.  She tells me for the last week she has been having left shoulder pain as well as right hip pain.  She tells me now she has noticed an increase in her reflux.       Review of her records indicates she had normal echocardiogram in 2019.  She has had prior Holter monitor showing PACs.  She tells me she was offered a beta-blocker but elected not to take that.  She has a grandparent who had a heart attack in his 60s.  She has history of IBS with predominantly diarrhea in the past although tells me now is having formed stool..  She has history of reflux for which she takes Nexium.  She had a positive HIDA scan recently and is waiting to see a surgeon regarding that.      Review of Systems    Past Medical History:   Diagnosis Date    GERD (gastroesophageal reflux disease) 2006    Heartburn     Hyperlipidemia 2019    Irritable bowel syndrome 2010    PAC (premature atrial contraction)     has had echo in past, no current treatments.    Ulcer 2006       No Known Allergies    Past Surgical History:   Procedure Laterality Date    UPPER GASTROINTESTINAL ENDOSCOPY  2006, 2016    WISDOM TOOTH EXTRACTION         Family History   Problem Relation Age of Onset    Cirrhosis Father     Alcohol abuse Father        Social History     Socioeconomic History    Marital status:    Tobacco Use    Smoking status: Never    Smokeless tobacco: Never   Vaping Use    Vaping status: Never Used   Substance and Sexual Activity    Alcohol use: Yes     Alcohol/week: 4.0 standard drinks of alcohol     Types: 2 Glasses of wine, 2 Drinks containing 0.5 oz of alcohol per week     Comment: occasionally    Drug use: No    Sexual activity: Yes      Partners: Male     Birth control/protection: None           Objective   Physical Exam  Vitals and nursing note reviewed.   Constitutional:       General: She is not in acute distress.     Appearance: Normal appearance.      Comments: Pleasant young lady in no distress   HENT:      Head: Normocephalic and atraumatic.      Nose: Nose normal. No congestion or rhinorrhea.   Eyes:      General: No scleral icterus.     Conjunctiva/sclera: Conjunctivae normal.   Neck:      Comments: No JVD   Cardiovascular:      Rate and Rhythm: Normal rate and regular rhythm.      Heart sounds: No murmur heard.     No friction rub.   Pulmonary:      Effort: Pulmonary effort is normal.      Breath sounds: Normal breath sounds. No wheezing or rales.   Abdominal:      General: Bowel sounds are normal.      Palpations: Abdomen is soft.      Tenderness: There is no abdominal tenderness. There is no guarding or rebound.   Musculoskeletal:         General: No tenderness.      Cervical back: Normal range of motion and neck supple.      Right lower leg: No edema.      Left lower leg: No edema.   Skin:     General: Skin is warm and dry.      Coloration: Skin is not pale.      Findings: No erythema.   Neurological:      General: No focal deficit present.      Mental Status: She is alert and oriented to person, place, and time.      Motor: No weakness.      Coordination: Coordination normal.   Psychiatric:         Mood and Affect: Mood normal.         Behavior: Behavior normal.         Thought Content: Thought content normal.         Procedures           ED Course  ED Course as of 04/19/24 1536   Fri Apr 19, 2024   1157 EKG is normal.  Reviewed and interpreted records as reflected above. [DT]   1333 D-dimer is low enough to rule out pulmonary embolism.  Chest x-ray is normal.  She now reports abdominal pain and rectal bleeding. [DT]   1349 I reexamined her.  There is a small amount of bright red blood on the anus.  I do not see any external fissures.   On digital exam there is only brown stool on the inside with no bleeding.  The bright red blood seems to be coming from the anus itself.  She tells me the bright red blood is smeared on the outside of formed stool.  She tells me her gastroenterologist is Acosta Keys, she had upper endoscopy in September of last year, never has had colonoscopy. [DT]   1411 Second troponin remains low. [DT]   1514 CT abdomen pelvis is normal other than hypodense lesion in the liver.  She is going to follow-up with gastroenterologist.  Heart score is 1 [DT]      ED Course User Index  [DT] Stephen Ventura MD                                             Medical Decision Making  Please see course notes.  I evaluated multiple complaints including chest pain, abdominal pain, rectal bleeding, near syncope.  Ordered and interpreted multiple labs.  Ordered and interpreted serial troponins several hours apart.  I ordered and interpreted chest x-ray as well as CT scan of her abdomen and pelvis.  Had reevaluation.    Problems Addressed:  Acute abdominal pain: complicated acute illness or injury that poses a threat to life or bodily functions  Chest pain, unspecified type: complicated acute illness or injury that poses a threat to life or bodily functions  Hematochezia: complicated acute illness or injury  Liver lesion: chronic illness or injury  Palpitations: complicated acute illness or injury    Amount and/or Complexity of Data Reviewed  External Data Reviewed: notes.  Labs: ordered. Decision-making details documented in ED Course.  Radiology: ordered. Decision-making details documented in ED Course.  ECG/medicine tests: ordered. Decision-making details documented in ED Course.    Risk  OTC drugs.  Prescription drug management.        Final diagnoses:   Acute abdominal pain   Chest pain, unspecified type   Hematochezia   Palpitations   Liver lesion       ED Disposition  ED Disposition       ED Disposition   Discharge    Condition   Stable     Comment   --               Flaquita Kimball,   [address] [place] [id number]  [phone]               Medication List        Stop      promethazine-dextromethorphan 6.25-15 MG/5ML syrup  Commonly known as: PROMETHAZINE-DM                 Stephen Ventura MD  04/19/24 1538

## 2024-04-19 NOTE — DISCHARGE INSTRUCTIONS
Tylenol as needed for pain.  Call your primary care provider and arrange follow-up.  Call Dr. Keys as well to arrange follow-up.

## 2024-04-20 LAB
QT INTERVAL: 362 MS
QTC INTERVAL: 450 MS

## 2024-06-12 ENCOUNTER — TRANSCRIBE ORDERS (OUTPATIENT)
Dept: LAB | Facility: HOSPITAL | Age: 34
End: 2024-06-12
Payer: COMMERCIAL

## 2024-06-12 ENCOUNTER — LAB (OUTPATIENT)
Dept: LAB | Facility: HOSPITAL | Age: 34
End: 2024-06-12
Payer: COMMERCIAL

## 2024-06-12 DIAGNOSIS — K82.8 ADHESION OF GALLBLADDER: Primary | ICD-10-CM

## 2024-06-12 DIAGNOSIS — K82.8 ADHESION OF GALLBLADDER: ICD-10-CM

## 2024-06-12 LAB
ANION GAP SERPL CALCULATED.3IONS-SCNC: 10.3 MMOL/L (ref 5–15)
BASOPHILS # BLD AUTO: 0.05 10*3/MM3 (ref 0–0.2)
BASOPHILS NFR BLD AUTO: 0.7 % (ref 0–1.5)
BUN SERPL-MCNC: 10 MG/DL (ref 6–20)
BUN/CREAT SERPL: 12.5 (ref 7–25)
CALCIUM SPEC-SCNC: 9.6 MG/DL (ref 8.6–10.5)
CHLORIDE SERPL-SCNC: 106 MMOL/L (ref 98–107)
CO2 SERPL-SCNC: 24.7 MMOL/L (ref 22–29)
CREAT SERPL-MCNC: 0.8 MG/DL (ref 0.57–1)
DEPRECATED RDW RBC AUTO: 42 FL (ref 37–54)
EGFRCR SERPLBLD CKD-EPI 2021: 98.1 ML/MIN/1.73
EOSINOPHIL # BLD AUTO: 0.07 10*3/MM3 (ref 0–0.4)
EOSINOPHIL NFR BLD AUTO: 1 % (ref 0.3–6.2)
ERYTHROCYTE [DISTWIDTH] IN BLOOD BY AUTOMATED COUNT: 12.5 % (ref 12.3–15.4)
GLUCOSE SERPL-MCNC: 80 MG/DL (ref 65–99)
HCT VFR BLD AUTO: 39 % (ref 34–46.6)
HGB BLD-MCNC: 12.8 G/DL (ref 12–15.9)
IMM GRANULOCYTES # BLD AUTO: 0.01 10*3/MM3 (ref 0–0.05)
IMM GRANULOCYTES NFR BLD AUTO: 0.1 % (ref 0–0.5)
LYMPHOCYTES # BLD AUTO: 2.33 10*3/MM3 (ref 0.7–3.1)
LYMPHOCYTES NFR BLD AUTO: 34.2 % (ref 19.6–45.3)
MCH RBC QN AUTO: 30.3 PG (ref 26.6–33)
MCHC RBC AUTO-ENTMCNC: 32.8 G/DL (ref 31.5–35.7)
MCV RBC AUTO: 92.2 FL (ref 79–97)
MONOCYTES # BLD AUTO: 0.36 10*3/MM3 (ref 0.1–0.9)
MONOCYTES NFR BLD AUTO: 5.3 % (ref 5–12)
NEUTROPHILS NFR BLD AUTO: 4 10*3/MM3 (ref 1.7–7)
NEUTROPHILS NFR BLD AUTO: 58.7 % (ref 42.7–76)
NRBC BLD AUTO-RTO: 0 /100 WBC (ref 0–0.2)
PLATELET # BLD AUTO: 332 10*3/MM3 (ref 140–450)
PMV BLD AUTO: 9.7 FL (ref 6–12)
POTASSIUM SERPL-SCNC: 4.5 MMOL/L (ref 3.5–5.2)
RBC # BLD AUTO: 4.23 10*6/MM3 (ref 3.77–5.28)
SODIUM SERPL-SCNC: 141 MMOL/L (ref 136–145)
WBC NRBC COR # BLD AUTO: 6.82 10*3/MM3 (ref 3.4–10.8)

## 2024-06-12 PROCEDURE — 80048 BASIC METABOLIC PNL TOTAL CA: CPT

## 2024-06-12 PROCEDURE — 36415 COLL VENOUS BLD VENIPUNCTURE: CPT

## 2024-06-12 PROCEDURE — 85025 COMPLETE CBC W/AUTO DIFF WBC: CPT

## 2024-06-17 ENCOUNTER — LAB REQUISITION (OUTPATIENT)
Dept: LAB | Facility: HOSPITAL | Age: 34
End: 2024-06-17
Payer: COMMERCIAL

## 2024-06-17 DIAGNOSIS — K82.8 OTHER SPECIFIED DISEASES OF GALLBLADDER: ICD-10-CM

## 2024-06-17 PROCEDURE — 88304 TISSUE EXAM BY PATHOLOGIST: CPT | Performed by: SURGERY

## 2024-06-18 LAB
CYTO UR: NORMAL
LAB AP CASE REPORT: NORMAL
LAB AP CLINICAL INFORMATION: NORMAL
PATH REPORT.FINAL DX SPEC: NORMAL
PATH REPORT.GROSS SPEC: NORMAL

## 2024-06-20 ENCOUNTER — OFFICE VISIT (OUTPATIENT)
Dept: FAMILY MEDICINE CLINIC | Facility: CLINIC | Age: 34
End: 2024-06-20
Payer: COMMERCIAL

## 2024-06-20 VITALS
OXYGEN SATURATION: 100 % | HEART RATE: 71 BPM | HEIGHT: 63 IN | WEIGHT: 133.8 LBS | BODY MASS INDEX: 23.71 KG/M2 | DIASTOLIC BLOOD PRESSURE: 72 MMHG | SYSTOLIC BLOOD PRESSURE: 116 MMHG

## 2024-06-20 DIAGNOSIS — Z00.00 ANNUAL PHYSICAL EXAM: ICD-10-CM

## 2024-06-20 DIAGNOSIS — Z00.00 ANNUAL PHYSICAL EXAM: Primary | ICD-10-CM

## 2024-06-20 PROBLEM — Z34.90 PREGNANCY: Status: RESOLVED | Noted: 2022-05-11 | Resolved: 2024-06-20

## 2024-06-20 PROBLEM — M94.0 COSTOCHONDRITIS: Status: RESOLVED | Noted: 2024-01-05 | Resolved: 2024-06-20

## 2024-06-20 PROBLEM — K21.9 GASTROESOPHAGEAL REFLUX DISEASE: Status: RESOLVED | Noted: 2020-12-14 | Resolved: 2024-06-20

## 2024-06-20 PROBLEM — J01.00 ACUTE NON-RECURRENT MAXILLARY SINUSITIS: Status: RESOLVED | Noted: 2024-03-26 | Resolved: 2024-06-20

## 2024-06-20 PROBLEM — J40 BRONCHITIS: Status: RESOLVED | Noted: 2023-12-12 | Resolved: 2024-06-20

## 2024-06-20 PROBLEM — O99.019 IRON DEFICIENCY ANEMIA DURING PREGNANCY: Status: RESOLVED | Noted: 2022-09-18 | Resolved: 2024-06-20

## 2024-06-20 PROBLEM — R07.81 RIB PAIN: Status: RESOLVED | Noted: 2023-12-29 | Resolved: 2024-06-20

## 2024-06-20 PROBLEM — D50.9 IRON DEFICIENCY ANEMIA DURING PREGNANCY: Status: RESOLVED | Noted: 2022-09-18 | Resolved: 2024-06-20

## 2024-06-20 PROBLEM — Z34.90 TERM PREGNANCY: Status: RESOLVED | Noted: 2022-09-16 | Resolved: 2024-06-20

## 2024-06-20 PROCEDURE — 99395 PREV VISIT EST AGE 18-39: CPT | Performed by: FAMILY MEDICINE

## 2024-06-20 NOTE — ASSESSMENT & PLAN NOTE
Overall doing well.  Preventative screenings discussed.  Pap smear performed today.  Immunizations reviewed.  Diet and exercise recommendations given

## 2024-06-20 NOTE — PROGRESS NOTES
Female Physical Note      Date: 2024   Patient Name: Shawnee Walker  : 1990   MRN: [id number]     Chief Complaint:    Chief Complaint   Patient presents with    Annual Exam     Yearly and pap       History of Present Illness: Shawnee Walker is a 34 y.o. female who is here today for their annual health maintenance and physical.  She current lives at home with her  and 2 children.    Patient denies any family history of cancer.  Patient did have a Pap smear completed last year.  She states that her previous Pap smear was abnormal but her repeat was negative.  Her  had a vasectomy.  She is not currently on any kind of birth control.  She has never had to have a mammogram or colonoscopy.  She denies any vaginal pain or discharge.  She denies any pain with intercourse.  Patient states she has no specific dietary restrictions.  She does note that she walks intermittently.  Patient is due for a fasting lipid panel    Subjective      Review of Systems:   Review of Systems   All other systems reviewed and are negative.      Past Medical History, Social History, Family History and Care Team were all reviewed with patient and updated as appropriate.     Medications:     Current Outpatient Medications:     albuterol sulfate  (90 Base) MCG/ACT inhaler, Inhale 2 puffs Every 4 (Four) Hours As Needed for Wheezing or Shortness of Air., Disp: 8 g, Rfl: 1    cholestyramine (Questran) 4 g packet, Take 1 packet by mouth 2 (Two) Times a Day As Needed (diarrhea)., Disp: 30 each, Rfl: 5    docusate sodium (COLACE) 100 MG capsule, Take 1 capsule by mouth 2 (Two) Times a Day., Disp: 20 capsule, Rfl: 0    esomeprazole (nexIUM) 40 MG capsule, Take 1 capsule by mouth Every Morning Before Breakfast., Disp: , Rfl:     fluticasone (FLONASE) 50 MCG/ACT nasal spray, 1 spray into the nostril(s) as directed by provider Daily., Disp: 9.9 mL, Rfl: 11    fluticasone (Flovent HFA) 44 MCG/ACT  "inhaler, Inhale 2 puffs 2 (Two) Times a Day., Disp: 10.6 g, Rfl: 0    Hydrocortisone Ace-Pramoxine 1-1 % rectal cream, Insert  into the rectum 2 (Two) Times a Day As Needed for Hemorrhoids., Disp: 28.4 g, Rfl: 1    meloxicam (MOBIC) 15 MG tablet, Take 1 tablet by mouth Daily., Disp: 30 tablet, Rfl: 5    ondansetron ODT (ZOFRAN-ODT) 4 MG disintegrating tablet, Place 1 tablet on the tongue Every 8 (Eight) Hours As Needed for Nausea or Vomiting., Disp: 10 tablet, Rfl: 0    ondansetron ODT (ZOFRAN-ODT) 4 MG disintegrating tablet, Place 1 tablet on the tongue Every 6 (Six) Hours As Needed., Disp: 30 tablet, Rfl: 0    oxyCODONE-acetaminophen (PERCOCET) 5-325 MG per tablet, Take 1 tablet by mouth every 4 hours as needed, Disp: 14 tablet, Rfl: 0    Plecanatide (Trulance) 3 MG tablet, Take 1 tablet by mouth Daily., Disp: 90 tablet, Rfl: 3    Allergies:   No Known Allergies    Immunizations:  Td/Tdap(Booster Q 10 yrs): Up-to-date  Flu (Yearly): Up-to-date  Colorectal Screening:     Last Completed Colonoscopy       This patient has no relevant Health Maintenance data.           Pap: Performed today  Last Completed Pap Smear       This patient has no relevant Health Maintenance data.           Mammogram:    Last Completed Mammogram       This patient has no relevant Health Maintenance data.                 PHQ-2 Depression Screening  Little interest or pleasure in doing things? 0-->not at all   Feeling down, depressed, or hopeless? 0-->not at all   PHQ-2 Total Score 0            Objective     Physical Exam:  Vital Signs:   Vitals:    06/20/24 1407   BP: 116/72   Pulse: 71   SpO2: 100%   Weight: 60.7 kg (133 lb 12.8 oz)   Height: 160 cm (62.99\")     BMI is within normal parameters. No other follow-up for BMI required.       Physical Exam  Vitals and nursing note reviewed.   Constitutional:       Appearance: Normal appearance. She is normal weight.   HENT:      Head: Normocephalic and atraumatic.      Nose: Nose normal.      " Mouth/Throat:      Mouth: Mucous membranes are moist.   Eyes:      Extraocular Movements: Extraocular movements intact.      Pupils: Pupils are equal, round, and reactive to light.   Cardiovascular:      Rate and Rhythm: Normal rate and regular rhythm.      Heart sounds: Normal heart sounds.   Pulmonary:      Effort: Pulmonary effort is normal.      Breath sounds: Normal breath sounds.   Abdominal:      General: Abdomen is flat.   Musculoskeletal:         General: Normal range of motion.      Cervical back: Normal range of motion.   Skin:     General: Skin is warm.   Neurological:      General: No focal deficit present.      Mental Status: She is alert and oriented to person, place, and time.   Psychiatric:         Mood and Affect: Mood normal.         Behavior: Behavior normal.         Thought Content: Thought content normal.         Judgment: Judgment normal.         Procedures    Assessment / Plan      Assessment/Plan:   Diagnoses and all orders for this visit:    1. Annual physical exam (Primary)  Assessment & Plan:  Overall doing well.  Preventative screenings discussed.  Pap smear performed today.  Immunizations reviewed.  Diet and exercise recommendations given    Orders:  -     LIQUID-BASED PAP SMEAR WITH HPV GENOTYPING REGARDLESS OF INTERPRETATION (DINO,COR,MAD); Future        Follow Up:   Return in about 1 year (around 6/20/2025) for Annual.    Healthcare Maintenance:   Counseling provided on immunizations, preventative screenings, diet and exercise recommendations.   Shawnee Walker voices understanding and acceptance of this advice and will call back with any further questions or concerns. AVS with preventive healthcare tips printed for patient.     Flaquita Kimball DO   [facility] [address]

## 2024-06-28 LAB — REF LAB TEST METHOD: NORMAL

## 2024-07-01 ENCOUNTER — OFFICE VISIT (OUTPATIENT)
Dept: GASTROENTEROLOGY | Facility: CLINIC | Age: 34
End: 2024-07-01
Payer: COMMERCIAL

## 2024-07-01 VITALS
DIASTOLIC BLOOD PRESSURE: 62 MMHG | WEIGHT: 133 LBS | SYSTOLIC BLOOD PRESSURE: 100 MMHG | HEART RATE: 98 BPM | BODY MASS INDEX: 23.57 KG/M2 | TEMPERATURE: 97.3 F | HEIGHT: 63 IN | OXYGEN SATURATION: 99 %

## 2024-07-01 DIAGNOSIS — K82.8 BILIARY DYSKINESIA: ICD-10-CM

## 2024-07-01 DIAGNOSIS — R10.10 UPPER ABDOMINAL PAIN: Primary | ICD-10-CM

## 2024-07-01 DIAGNOSIS — K58.1 IRRITABLE BOWEL SYNDROME WITH CONSTIPATION: ICD-10-CM

## 2024-07-01 PROCEDURE — 99214 OFFICE O/P EST MOD 30 MIN: CPT | Performed by: INTERNAL MEDICINE

## 2024-07-01 RX ORDER — SUCRALFATE 1 G/1
1 TABLET ORAL 4 TIMES DAILY
Qty: 90 TABLET | Refills: 3 | Status: SHIPPED | OUTPATIENT
Start: 2024-07-01

## 2024-07-01 NOTE — PROGRESS NOTES
PCP: Flaquita Kimball DO    No chief complaint on file.      History of Present Illness:   Shawnee Walker is a 34 y.o. female who presents to GI clinic as a follow up for constipation,dysphagia. Since last visit she has undergone egd and had her gallbladder removed. She had improved dysphagia after optimizing constipation, dilation. After lap ccy, she had a brief time period of diarrhea. She has more epigastric burning.    Past Medical History:   Diagnosis Date   • GERD (gastroesophageal reflux disease) 2006   • Heartburn    • Hyperlipidemia 2019   • Irritable bowel syndrome 2010   • PAC (premature atrial contraction)     has had echo in past, no current treatments.   • Ulcer 2006       Past Surgical History:   Procedure Laterality Date   • UPPER GASTROINTESTINAL ENDOSCOPY  2006, 2016   • WISDOM TOOTH EXTRACTION           Current Outpatient Medications:   •  albuterol sulfate  (90 Base) MCG/ACT inhaler, Inhale 2 puffs Every 4 (Four) Hours As Needed for Wheezing or Shortness of Air., Disp: 8 g, Rfl: 1  •  cholestyramine (Questran) 4 g packet, Take 1 packet by mouth 2 (Two) Times a Day As Needed (diarrhea)., Disp: 30 each, Rfl: 5  •  docusate sodium (COLACE) 100 MG capsule, Take 1 capsule by mouth 2 (Two) Times a Day., Disp: 20 capsule, Rfl: 0  •  esomeprazole (nexIUM) 40 MG capsule, Take 1 capsule by mouth Every Morning Before Breakfast., Disp: , Rfl:   •  fluticasone (FLONASE) 50 MCG/ACT nasal spray, 1 spray into the nostril(s) as directed by provider Daily., Disp: 9.9 mL, Rfl: 11  •  fluticasone (Flovent HFA) 44 MCG/ACT inhaler, Inhale 2 puffs 2 (Two) Times a Day., Disp: 10.6 g, Rfl: 0  •  Hydrocortisone Ace-Pramoxine 1-1 % rectal cream, Insert  into the rectum 2 (Two) Times a Day As Needed for Hemorrhoids., Disp: 28.4 g, Rfl: 1  •  meloxicam (MOBIC) 15 MG tablet, Take 1 tablet by mouth Daily., Disp: 30 tablet, Rfl: 5  •  ondansetron ODT (ZOFRAN-ODT) 4 MG disintegrating tablet, Place 1  tablet on the tongue Every 8 (Eight) Hours As Needed for Nausea or Vomiting., Disp: 10 tablet, Rfl: 0  •  ondansetron ODT (ZOFRAN-ODT) 4 MG disintegrating tablet, Place 1 tablet on the tongue Every 6 (Six) Hours As Needed., Disp: 30 tablet, Rfl: 0  •  oxyCODONE-acetaminophen (PERCOCET) 5-325 MG per tablet, Take 1 tablet by mouth every 4 hours as needed, Disp: 14 tablet, Rfl: 0  •  Plecanatide (Trulance) 3 MG tablet, Take 1 tablet by mouth Daily., Disp: 90 tablet, Rfl: 3    No Known Allergies    Family History   Problem Relation Age of Onset   • Cirrhosis Father    • Alcohol abuse Father        Social History     Socioeconomic History   • Marital status:    Tobacco Use   • Smoking status: Never   • Smokeless tobacco: Never   Vaping Use   • Vaping status: Never Used   Substance and Sexual Activity   • Alcohol use: Yes     Alcohol/week: 4.0 standard drinks of alcohol     Types: 2 Glasses of wine, 2 Drinks containing 0.5 oz of alcohol per week     Comment: occasionally   • Drug use: No   • Sexual activity: Yes     Partners: Male     Birth control/protection: None       Review of Systems  A complete 12 point ros was asked and is negative except for that mentioned above.  In particular:  No fever  No rash  No increased arthralgias  No worsening edema  No cough  No dyspnea  No chest pain      There were no vitals filed for this visit.    Physical Exam  General: well developed, well nourished  A+O x 3 NAD  HEENT: NCAT, pupils equal appearing, sclera appear white  NECK: full ROM  Respiratory: symmetric chest rise, normal effort, normal work of breathing, no overt rales  Abdomen: non-distended  Skin: normal color, no jaundice  Neuro: no tremor, no facial droop  Psych: normal mood and affect    Workup  Egd: 7/7/23  Fluid in lower esophagus, dilated 54 fr savary, mild gastritis, No eoe or celiac  S/p gallbladder resection 6/2024    Assessment/Plan  1.) h/o Dysphagia  H/o 3 egds, 1 by me 2023, previous GI: around  2017;  Differential includes MARCELLA/motility, peptic stricture; reportedly h/o dilation and esophageal ulceration-data deficient  Plan:  Her dysphagia improved after dilation and optimizing constipation. She is thin and still had retained refluxate suspicious for scleroderma.  She feels well now. Future consideration would be to refer to rheumatology. She denies reynmauricio's.  To let me know when/if dysphagia recurs    2.) GERD  Continue ppi  Last egd: 2023  3.) constipation  Uses prn amitiza.     Plan:  Colace and mag oxide otc daily with good hydration  Stress control  Suspect ibs-c, component of outlet possible.   - provided samples of ibsrela, linzess, and trulance    4.) Epigastric pain  5.) s/p ccy  Will try carafate to empirically treat biliary gastritis    Syed Keys MD  7/1/2024

## 2024-07-03 ENCOUNTER — PATIENT MESSAGE (OUTPATIENT)
Dept: FAMILY MEDICINE CLINIC | Facility: CLINIC | Age: 34
End: 2024-07-03
Payer: COMMERCIAL

## 2024-07-03 DIAGNOSIS — N76.0 BACTERIAL VAGINOSIS: Primary | ICD-10-CM

## 2024-07-03 DIAGNOSIS — B96.89 BACTERIAL VAGINOSIS: Primary | ICD-10-CM

## 2024-07-03 RX ORDER — METRONIDAZOLE 500 MG/1
500 TABLET ORAL 2 TIMES DAILY
Qty: 14 TABLET | Refills: 0 | Status: SHIPPED | OUTPATIENT
Start: 2024-07-03 | End: 2024-07-10

## 2024-07-03 NOTE — TELEPHONE ENCOUNTER
From: Shawnee Walker  To: Flaquita Rehana  Sent: 7/3/2024 9:54 AM EDT  Subject: PAP results    Thank you for the pap results! I actually have noticed an odor and thicker discharge in the last week (seems to be somewhat better now) but if you think I should go ahead and do a course of antibiotics can you please have them sent to [facility]. Thanks!

## 2024-08-26 ENCOUNTER — OFFICE VISIT (OUTPATIENT)
Dept: FAMILY MEDICINE CLINIC | Facility: CLINIC | Age: 34
End: 2024-08-26
Payer: COMMERCIAL

## 2024-08-26 VITALS
WEIGHT: 132.8 LBS | OXYGEN SATURATION: 96 % | HEIGHT: 63 IN | DIASTOLIC BLOOD PRESSURE: 82 MMHG | BODY MASS INDEX: 23.53 KG/M2 | SYSTOLIC BLOOD PRESSURE: 120 MMHG | HEART RATE: 88 BPM

## 2024-08-26 DIAGNOSIS — K21.9 GASTROESOPHAGEAL REFLUX DISEASE WITHOUT ESOPHAGITIS: Primary | ICD-10-CM

## 2024-08-26 DIAGNOSIS — F41.9 ANXIETY: ICD-10-CM

## 2024-08-26 DIAGNOSIS — E78.01 FAMILIAL HYPERCHOLESTEROLEMIA: ICD-10-CM

## 2024-08-26 PROBLEM — Z00.00 ANNUAL PHYSICAL EXAM: Status: RESOLVED | Noted: 2024-06-20 | Resolved: 2024-08-26

## 2024-08-26 PROBLEM — F41.1 GENERALIZED ANXIETY DISORDER: Status: RESOLVED | Noted: 2023-11-14 | Resolved: 2024-08-26

## 2024-08-26 PROBLEM — K82.8 BILIARY DYSKINESIA: Status: RESOLVED | Noted: 2024-03-26 | Resolved: 2024-08-26

## 2024-08-26 PROCEDURE — 99214 OFFICE O/P EST MOD 30 MIN: CPT | Performed by: FAMILY MEDICINE

## 2024-08-26 PROCEDURE — 93000 ELECTROCARDIOGRAM COMPLETE: CPT | Performed by: FAMILY MEDICINE

## 2024-08-26 NOTE — ASSESSMENT & PLAN NOTE
Lipid abnormalities are improving with lifestyle modifications    Plan:  Continue same medication/s without change.      Discussed medication dosage, use, side effects, and goals of treatment in detail.    Counseled patient on lifestyle modifications to help control hyperlipidemia.   Cholesterol lowering dietary information shared with patient.  Advised patient to exercise for 150 minutes weekly. (30 minute brisk walk, 5 days a week for example)    Patient Treatment Goals:   LDL goal is under 100    Followup at the next regular appointment.

## 2024-08-26 NOTE — ASSESSMENT & PLAN NOTE
Will obtain GeneSight today, due to previous intolerance to medicines will review results before initiating therapy.  EKG completed today and overall within normal limits.

## 2024-08-26 NOTE — PROGRESS NOTES
Office Note     Name: Shawnee Walker    : 1990     MRN: [id number]     Chief Complaint  Anxiety (Last couple of weeks and she states she has been having chest pressure and in her shoulder ) and Abdominal Pain (No pain just a burning feeling in her stomach for the last 2 weeks )    Subjective     History of Present Illness:  Shawnee Walker is a 34 y.o. female who presents today for follow-up evaluation.    Patient presents today to discuss general anxiety disorder.  This is longstanding diagnosis for this patient.  She notes that in the past she has tried trial of Wellbutrin, Zoloft, Prozac however she had adverse reactions to these medicines.  She also notes that she undergone talk therapy in the past with minimal effectiveness.  She notes that over the past couple weeks she has been having increased chest tightness and moments of anxiety.  She notes that it radiates up into her head and down her arm.  She notes that in the past she has had a cardiac evaluation that was overall unremarkable.  She underwent a EKG in April that showed a sinus arrhythmia.  She denies any palpitations at this time.    Patient is also here today to follow-up on GERD.  She notes her symptoms have been persistent since she had her cholecystectomy earlier this year.  She recently followed up with her gastroenterologist, and he mention possible scleroderma.  She notes he has no family history of this and not undergone evaluation in the past.  She did undergo an EGD back in  that was overall negative for H. pylori.  She has required esophageal dilatation in the past but is not currently experiencing dysphagia.  She notes she has been taking her Nexium daily along with Carafate as needed.      Review of Systems:   Review of Systems   Gastrointestinal:  Positive for abdominal pain, GERD and indigestion.   Psychiatric/Behavioral:  The patient is nervous/anxious.    All other systems reviewed and are  negative.      Past Medical History:   Past Medical History:   Diagnosis Date    GERD (gastroesophageal reflux disease) 2006    Heartburn     Hyperlipidemia 2019    Irritable bowel syndrome 2010    PAC (premature atrial contraction)     has had echo in past, no current treatments.    Ulcer 2006       Past Surgical History:   Past Surgical History:   Procedure Laterality Date    UPPER GASTROINTESTINAL ENDOSCOPY  2006, 2016    WISDOM TOOTH EXTRACTION         Family History:   Family History   Problem Relation Age of Onset    Cirrhosis Father     Alcohol abuse Father        Social History:   Social History     Socioeconomic History    Marital status:    Tobacco Use    Smoking status: Never    Smokeless tobacco: Never   Vaping Use    Vaping status: Never Used   Substance and Sexual Activity    Alcohol use: Yes     Alcohol/week: 4.0 standard drinks of alcohol     Types: 2 Glasses of wine, 2 Drinks containing 0.5 oz of alcohol per week     Comment: occasionally    Drug use: No    Sexual activity: Yes     Partners: Male     Birth control/protection: None       Immunizations:   Immunization History   Administered Date(s) Administered    31-influenza Vac Quardvalent Preservativ 09/28/2020    Flu Vaccine Intradermal Quad 18-64YR 10/02/2023    Hep B, Adolescent or Pediatric 03/02/2006, 04/28/2006, 10/09/2006    Hepatitis A 02/21/2019    MCV4 Unspecified 10/09/2006    MMR 07/16/1999    Td (TDVAX) 03/02/2006    Tdap 01/15/2009, 01/30/2020    Varicella 01/15/2009        Medications:     Current Outpatient Medications:     albuterol sulfate  (90 Base) MCG/ACT inhaler, Inhale 2 puffs Every 4 (Four) Hours As Needed for Wheezing or Shortness of Air., Disp: 8 g, Rfl: 1    docusate sodium (COLACE) 100 MG capsule, Take 1 capsule by mouth 2 (Two) Times a Day., Disp: 20 capsule, Rfl: 0    esomeprazole (nexIUM) 40 MG capsule, Take 1 capsule by mouth Every Morning Before Breakfast., Disp: , Rfl:     fluticasone (FLONASE) 50  "MCG/ACT nasal spray, 1 spray into the nostril(s) as directed by provider Daily., Disp: 9.9 mL, Rfl: 11    fluticasone (Flovent HFA) 44 MCG/ACT inhaler, Inhale 2 puffs 2 (Two) Times a Day., Disp: 10.6 g, Rfl: 0    Hydrocortisone Ace-Pramoxine 1-1 % rectal cream, Insert  into the rectum 2 (Two) Times a Day As Needed for Hemorrhoids., Disp: 28.4 g, Rfl: 1    ondansetron ODT (ZOFRAN-ODT) 4 MG disintegrating tablet, Place 1 tablet on the tongue Every 6 (Six) Hours As Needed., Disp: 30 tablet, Rfl: 0    Plecanatide (Trulance) 3 MG tablet, Take 1 tablet by mouth Daily., Disp: 90 tablet, Rfl: 3    sucralfate (Carafate) 1 g tablet, Take 1 tablet by mouth 4 (Four) Times a Day. Take for 2 weeks then use as needed, Disp: 90 tablet, Rfl: 3    Allergies:   No Known Allergies    Objective     Vital Signs  /82   Pulse 88   Ht 160 cm (62.99\")   Wt 60.2 kg (132 lb 12.8 oz)   SpO2 96%   BMI 23.53 kg/m²   Estimated body mass index is 23.53 kg/m² as calculated from the following:    Height as of this encounter: 160 cm (62.99\").    Weight as of this encounter: 60.2 kg (132 lb 12.8 oz).    BMI is within normal parameters. No other follow-up for BMI required.       Physical Exam  Vitals and nursing note reviewed.   Constitutional:       Appearance: Normal appearance. She is normal weight.   HENT:      Head: Normocephalic and atraumatic.      Nose: Nose normal.      Mouth/Throat:      Mouth: Mucous membranes are moist.   Eyes:      Extraocular Movements: Extraocular movements intact.      Pupils: Pupils are equal, round, and reactive to light.   Cardiovascular:      Rate and Rhythm: Normal rate.   Pulmonary:      Effort: Pulmonary effort is normal.   Abdominal:      General: Abdomen is flat.   Musculoskeletal:         General: Normal range of motion.      Cervical back: Normal range of motion.   Skin:     General: Skin is warm.   Neurological:      General: No focal deficit present.      Mental Status: She is alert and oriented " to person, place, and time.   Psychiatric:         Mood and Affect: Mood normal.         Behavior: Behavior normal.         Thought Content: Thought content normal.         Judgment: Judgment normal.           ECG 12 Lead    Date/Time: 8/26/2024 12:54 PM  Performed by: Flaquita Kimball DO    Authorized by: Flaquita Kimball DO  Comparison: compared with previous ECG from 4/19/2024  Comparison to previous ECG: No longer showing sinus arrhythmia  Rhythm: sinus rhythm  Rate: normal  BPM: 76  ST Segments: ST segments normal  T Waves: T waves normal  QRS axis: normal  Other: no other findings    Clinical impression: normal ECG           Results:  No results found for this or any previous visit (from the past 24 hour(s)).     Assessment and Plan     Assessment/Plan:  Diagnoses and all orders for this visit:    1. Gastroesophageal reflux disease without esophagitis (Primary)  Assessment & Plan:  Will obtain laboratory evaluation today and continue current medication regimen.    Orders:  -     CBC (No Diff); Future  -     Comprehensive Metabolic Panel; Future  -     High Sensitivity CRP; Future  -     SANDRA by IFA, Reflex 9-biomarkers profile; Future  -     Magnesium; Future  -     Vitamin B12; Future  -     H. Pylori Antigen, Stool - Stool, Per Rectum; Future    2. Anxiety  Assessment & Plan:  Will obtain GeneSight today, due to previous intolerance to medicines will review results before initiating therapy.  EKG completed today and overall within normal limits.    Orders:  -     TSH; Future  -     GeneSight - Swab,; Future    3. Familial hypercholesterolemia  Assessment & Plan:   Lipid abnormalities are improving with lifestyle modifications    Plan:  Continue same medication/s without change.      Discussed medication dosage, use, side effects, and goals of treatment in detail.    Counseled patient on lifestyle modifications to help control hyperlipidemia.   Cholesterol lowering dietary information shared with  patient.  Advised patient to exercise for 150 minutes weekly. (30 minute brisk walk, 5 days a week for example)    Patient Treatment Goals:   LDL goal is under 100    Followup at the next regular appointment.    Orders:  -     Lipid Panel; Future  -     Hemoglobin A1c; Future    Other orders  -     ECG 12 Lead        Follow Up  Return in about 3 months (around 11/26/2024) for Recheck.    Flaquita Kimball,    [facility]

## 2024-08-27 ENCOUNTER — LAB (OUTPATIENT)
Dept: LAB | Facility: HOSPITAL | Age: 34
End: 2024-08-27
Payer: COMMERCIAL

## 2024-08-27 DIAGNOSIS — E78.01 FAMILIAL HYPERCHOLESTEROLEMIA: ICD-10-CM

## 2024-08-27 DIAGNOSIS — R79.89 LOW TSH LEVEL: ICD-10-CM

## 2024-08-27 DIAGNOSIS — F41.9 ANXIETY: ICD-10-CM

## 2024-08-27 DIAGNOSIS — K21.9 GASTROESOPHAGEAL REFLUX DISEASE WITHOUT ESOPHAGITIS: ICD-10-CM

## 2024-08-27 LAB
ALBUMIN SERPL-MCNC: 4.6 G/DL (ref 3.5–5.2)
ALBUMIN/GLOB SERPL: 1.9 G/DL
ALP SERPL-CCNC: 45 U/L (ref 39–117)
ALT SERPL W P-5'-P-CCNC: 10 U/L (ref 1–33)
ANION GAP SERPL CALCULATED.3IONS-SCNC: 8.9 MMOL/L (ref 5–15)
AST SERPL-CCNC: 11 U/L (ref 1–32)
BILIRUB SERPL-MCNC: 1.1 MG/DL (ref 0–1.2)
BUN SERPL-MCNC: 10 MG/DL (ref 6–20)
BUN/CREAT SERPL: 13 (ref 7–25)
CALCIUM SPEC-SCNC: 9.5 MG/DL (ref 8.6–10.5)
CHLORIDE SERPL-SCNC: 104 MMOL/L (ref 98–107)
CHOLEST SERPL-MCNC: 207 MG/DL (ref 0–200)
CO2 SERPL-SCNC: 26.1 MMOL/L (ref 22–29)
CREAT SERPL-MCNC: 0.77 MG/DL (ref 0.57–1)
CRP SERPL-MCNC: 0.04 MG/DL (ref 0.01–0.5)
DEPRECATED RDW RBC AUTO: 43.2 FL (ref 37–54)
EGFRCR SERPLBLD CKD-EPI 2021: 102.7 ML/MIN/1.73
ERYTHROCYTE [DISTWIDTH] IN BLOOD BY AUTOMATED COUNT: 12.8 % (ref 12.3–15.4)
GLOBULIN UR ELPH-MCNC: 2.4 GM/DL
GLUCOSE SERPL-MCNC: 92 MG/DL (ref 65–99)
HBA1C MFR BLD: 5.1 % (ref 4.8–5.6)
HCT VFR BLD AUTO: 37.1 % (ref 34–46.6)
HDLC SERPL-MCNC: 59 MG/DL (ref 40–60)
HGB BLD-MCNC: 12.3 G/DL (ref 12–15.9)
LDLC SERPL CALC-MCNC: 139 MG/DL (ref 0–100)
LDLC/HDLC SERPL: 2.34 {RATIO}
MAGNESIUM SERPL-MCNC: 2.1 MG/DL (ref 1.6–2.6)
MCH RBC QN AUTO: 30.4 PG (ref 26.6–33)
MCHC RBC AUTO-ENTMCNC: 33.2 G/DL (ref 31.5–35.7)
MCV RBC AUTO: 91.6 FL (ref 79–97)
PLATELET # BLD AUTO: 310 10*3/MM3 (ref 140–450)
PMV BLD AUTO: 9.2 FL (ref 6–12)
POTASSIUM SERPL-SCNC: 4.3 MMOL/L (ref 3.5–5.2)
PROT SERPL-MCNC: 7 G/DL (ref 6–8.5)
RBC # BLD AUTO: 4.05 10*6/MM3 (ref 3.77–5.28)
SODIUM SERPL-SCNC: 139 MMOL/L (ref 136–145)
TRIGL SERPL-MCNC: 49 MG/DL (ref 0–150)
TSH SERPL DL<=0.05 MIU/L-ACNC: 0.7 UIU/ML (ref 0.27–4.2)
VIT B12 BLD-MCNC: 645 PG/ML (ref 211–946)
VLDLC SERPL-MCNC: 9 MG/DL (ref 5–40)
WBC NRBC COR # BLD AUTO: 4.95 10*3/MM3 (ref 3.4–10.8)

## 2024-08-27 PROCEDURE — 80050 GENERAL HEALTH PANEL: CPT

## 2024-08-27 PROCEDURE — 83036 HEMOGLOBIN GLYCOSYLATED A1C: CPT

## 2024-08-27 PROCEDURE — 86141 C-REACTIVE PROTEIN HS: CPT

## 2024-08-27 PROCEDURE — 83735 ASSAY OF MAGNESIUM: CPT

## 2024-08-27 PROCEDURE — 80061 LIPID PANEL: CPT

## 2024-08-27 PROCEDURE — 82607 VITAMIN B-12: CPT

## 2024-08-27 PROCEDURE — 36415 COLL VENOUS BLD VENIPUNCTURE: CPT

## 2024-08-27 PROCEDURE — 86038 ANTINUCLEAR ANTIBODIES: CPT

## 2024-08-28 LAB
ANA SER QL IF: NEGATIVE
LABORATORY COMMENT REPORT: NORMAL

## 2024-08-30 ENCOUNTER — PATIENT MESSAGE (OUTPATIENT)
Dept: FAMILY MEDICINE CLINIC | Facility: CLINIC | Age: 34
End: 2024-08-30
Payer: COMMERCIAL

## 2024-08-30 DIAGNOSIS — F41.9 ANXIETY: Primary | ICD-10-CM

## 2024-08-30 RX ORDER — DULOXETIN HYDROCHLORIDE 30 MG/1
30 CAPSULE, DELAYED RELEASE ORAL DAILY
Qty: 30 CAPSULE | Refills: 5 | Status: SHIPPED | OUTPATIENT
Start: 2024-08-30

## 2024-08-30 NOTE — TELEPHONE ENCOUNTER
From: Shawnee Walker  To: Flaquita Kimball  Sent: 8/30/2024 2:32 PM EDT  Subject: Test results     I saw the results for my genesight and very interesting everything I have tried was in my moderate risk category. Is there anything you would recommend more for anxiety from the ‘use as directed’ options??     I was also wondering about my genotype being associated with reduced folic acid. I know my b12 was normal but do you think checking my folic acid would be indicated?? Or would you recommend any folate supplements at this time?     Thanks so much!

## 2024-09-11 ENCOUNTER — LAB (OUTPATIENT)
Dept: LAB | Facility: HOSPITAL | Age: 34
End: 2024-09-11
Payer: COMMERCIAL

## 2024-09-11 DIAGNOSIS — K21.9 GASTROESOPHAGEAL REFLUX DISEASE WITHOUT ESOPHAGITIS: ICD-10-CM

## 2024-09-11 PROCEDURE — 87338 HPYLORI STOOL AG IA: CPT

## 2024-09-13 LAB — H PYLORI AG STL QL IA: NEGATIVE

## 2024-09-19 ENCOUNTER — OUTSIDE FACILITY SERVICE (OUTPATIENT)
Dept: GASTROENTEROLOGY | Facility: CLINIC | Age: 34
End: 2024-09-19
Payer: COMMERCIAL

## 2024-09-19 PROCEDURE — 88305 TISSUE EXAM BY PATHOLOGIST: CPT | Performed by: INTERNAL MEDICINE

## 2024-09-19 PROCEDURE — 43239 EGD BIOPSY SINGLE/MULTIPLE: CPT | Performed by: INTERNAL MEDICINE

## 2024-09-19 PROCEDURE — 43248 EGD GUIDE WIRE INSERTION: CPT | Performed by: INTERNAL MEDICINE

## 2024-09-20 ENCOUNTER — LAB REQUISITION (OUTPATIENT)
Dept: LAB | Facility: HOSPITAL | Age: 34
End: 2024-09-20
Payer: COMMERCIAL

## 2024-09-20 DIAGNOSIS — K22.2 ESOPHAGEAL OBSTRUCTION: ICD-10-CM

## 2024-09-20 DIAGNOSIS — K31.89 OTHER DISEASES OF STOMACH AND DUODENUM: ICD-10-CM

## 2024-09-20 DIAGNOSIS — R12 HEARTBURN: ICD-10-CM

## 2024-09-20 DIAGNOSIS — R13.10 DYSPHAGIA, UNSPECIFIED: ICD-10-CM

## 2024-09-20 DIAGNOSIS — K21.9 GASTRO-ESOPHAGEAL REFLUX DISEASE WITHOUT ESOPHAGITIS: ICD-10-CM

## 2024-10-04 ENCOUNTER — LAB (OUTPATIENT)
Dept: LAB | Facility: HOSPITAL | Age: 34
End: 2024-10-04
Payer: COMMERCIAL

## 2024-10-04 DIAGNOSIS — F41.9 ANXIETY: ICD-10-CM

## 2024-10-04 LAB — FOLATE SERPL-MCNC: 14.6 NG/ML (ref 4.78–24.2)

## 2024-10-04 PROCEDURE — 82746 ASSAY OF FOLIC ACID SERUM: CPT

## 2024-10-07 ENCOUNTER — OFFICE VISIT (OUTPATIENT)
Dept: FAMILY MEDICINE CLINIC | Facility: CLINIC | Age: 34
End: 2024-10-07
Payer: COMMERCIAL

## 2024-10-07 VITALS
HEIGHT: 63 IN | BODY MASS INDEX: 23.39 KG/M2 | OXYGEN SATURATION: 99 % | DIASTOLIC BLOOD PRESSURE: 74 MMHG | HEART RATE: 79 BPM | SYSTOLIC BLOOD PRESSURE: 118 MMHG | WEIGHT: 132 LBS

## 2024-10-07 DIAGNOSIS — M26.609 TMJ (TEMPOROMANDIBULAR JOINT DISORDER): Primary | ICD-10-CM

## 2024-10-07 DIAGNOSIS — G44.219 EPISODIC TENSION-TYPE HEADACHE, NOT INTRACTABLE: ICD-10-CM

## 2024-10-07 PROCEDURE — 99214 OFFICE O/P EST MOD 30 MIN: CPT | Performed by: FAMILY MEDICINE

## 2024-10-07 RX ORDER — CYCLOBENZAPRINE HCL 5 MG
5 TABLET ORAL NIGHTLY
Qty: 30 TABLET | Refills: 0 | Status: SHIPPED | OUTPATIENT
Start: 2024-10-07

## 2024-10-07 NOTE — PROGRESS NOTES
Office Note     Name: Shawnee Walker    : 1990     MRN: [id number]     Chief Complaint  Migraine (Could be connected to jaw and having some neck pain) and Jaw Pain (Jaw tigling feeling x1 month starting to get a lot of tension as well)    Subjective     History of Present Illness:  Shawnee Walker is a 34 y.o. female who presents today for bilateral jaw pain.    Patient notes that around 1 month ago she started developing tingling and pain in her bilateral jaw.  She notes that she feels there is a lot of tension in her jaw and does have a popping or clicking sound when opening her mouth.  She notes that it will radiate to the back of her neck occasionally.  She also notes that seems to be related to her neck pain and increase in headaches.  She does note that she has been told that she grinds her teeth as a child.  She currently does not wear a mouthguard.  She is unfortunately unable to tolerate NSAIDs secondary to her gastrointestinal condition.  She does note that within the past month she did have a EGD for esophageal dilatation.  She follows regularly with a dentist.  She has not previously seen an ENT.  She has been taking Tylenol 1000 mg twice daily for this condition with minimal relief of symptoms.    Review of Systems:   Review of Systems   HENT:  Positive for sinus pressure. Negative for swollen glands, trouble swallowing and voice change.    Neurological:  Positive for headache.   All other systems reviewed and are negative.      Past Medical History:   Past Medical History:   Diagnosis Date   • GERD (gastroesophageal reflux disease)    • Heartburn    • Hyperlipidemia    • Irritable bowel syndrome    • PAC (premature atrial contraction)     has had echo in past, no current treatments.   • Ulcer        Past Surgical History:   Past Surgical History:   Procedure Laterality Date   • UPPER GASTROINTESTINAL ENDOSCOPY  ,    • WISDOM TOOTH EXTRACTION          Family History:   Family History   Problem Relation Age of Onset   • Cirrhosis Father    • Alcohol abuse Father        Social History:   Social History     Socioeconomic History   • Marital status:    Tobacco Use   • Smoking status: Never   • Smokeless tobacco: Never   Vaping Use   • Vaping status: Never Used   Substance and Sexual Activity   • Alcohol use: Yes     Alcohol/week: 4.0 standard drinks of alcohol     Types: 2 Glasses of wine, 2 Drinks containing 0.5 oz of alcohol per week     Comment: occasionally   • Drug use: No   • Sexual activity: Yes     Partners: Male     Birth control/protection: None       Immunizations:   Immunization History   Administered Date(s) Administered   • 31-influenza Vac Quardvalent Preservativ 09/28/2020   • Flu Vaccine Intradermal Quad 18-64YR 10/02/2023   • Hep B, Adolescent or Pediatric 03/02/2006, 04/28/2006, 10/09/2006   • Hepatitis A 02/21/2019   • MCV4 Unspecified 10/09/2006   • MMR 07/16/1999   • Td (TDVAX) 03/02/2006   • Tdap 01/15/2009, 01/30/2020   • Varicella 01/15/2009        Medications:     Current Outpatient Medications:   •  albuterol sulfate  (90 Base) MCG/ACT inhaler, Inhale 2 puffs Every 4 (Four) Hours As Needed for Wheezing or Shortness of Air., Disp: 8 g, Rfl: 1  •  docusate sodium (COLACE) 100 MG capsule, Take 1 capsule by mouth 2 (Two) Times a Day., Disp: 20 capsule, Rfl: 0  •  esomeprazole (nexIUM) 40 MG capsule, Take 1 capsule by mouth Every Morning Before Breakfast., Disp: , Rfl:   •  fluticasone (FLONASE) 50 MCG/ACT nasal spray, 1 spray into the nostril(s) as directed by provider Daily., Disp: 9.9 mL, Rfl: 11  •  fluticasone (Flovent HFA) 44 MCG/ACT inhaler, Inhale 2 puffs 2 (Two) Times a Day., Disp: 10.6 g, Rfl: 0  •  Hydrocortisone Ace-Pramoxine 1-1 % rectal cream, Insert  into the rectum 2 (Two) Times a Day As Needed for Hemorrhoids., Disp: 28.4 g, Rfl: 1  •  ondansetron ODT (ZOFRAN-ODT) 4 MG disintegrating tablet, Place 1 tablet on  "the tongue Every 6 (Six) Hours As Needed., Disp: 30 tablet, Rfl: 0  •  Plecanatide (Trulance) 3 MG tablet, Take 1 tablet by mouth Daily., Disp: 90 tablet, Rfl: 3  •  sucralfate (Carafate) 1 g tablet, Take 1 tablet by mouth 4 (Four) Times a Day. Take for 2 weeks then use as needed, Disp: 90 tablet, Rfl: 3  •  cyclobenzaprine (FLEXERIL) 5 MG tablet, Take 1 tablet by mouth Every Night., Disp: 30 tablet, Rfl: 0    Allergies:   No Known Allergies    Objective     Vital Signs  /74   Pulse 79   Ht 160 cm (62.99\")   Wt 59.9 kg (132 lb)   SpO2 99%   BMI 23.39 kg/m²   Estimated body mass index is 23.39 kg/m² as calculated from the following:    Height as of this encounter: 160 cm (62.99\").    Weight as of this encounter: 59.9 kg (132 lb).    BMI is within normal parameters. No other follow-up for BMI required.       Physical Exam  Vitals and nursing note reviewed.   Constitutional:       Appearance: Normal appearance. She is normal weight.   HENT:      Head: Normocephalic and atraumatic.      Comments: Mild tenderness over masseter muscles with slight spasm     Nose: Nose normal.      Mouth/Throat:      Mouth: Mucous membranes are moist.   Eyes:      Extraocular Movements: Extraocular movements intact.      Pupils: Pupils are equal, round, and reactive to light.   Cardiovascular:      Rate and Rhythm: Normal rate.   Pulmonary:      Effort: Pulmonary effort is normal.   Abdominal:      General: Abdomen is flat.   Musculoskeletal:         General: Normal range of motion.      Cervical back: Normal range of motion.   Skin:     General: Skin is warm.   Neurological:      General: No focal deficit present.      Mental Status: She is alert and oriented to person, place, and time.   Psychiatric:         Mood and Affect: Mood normal.         Behavior: Behavior normal.         Thought Content: Thought content normal.         Judgment: Judgment normal.            Procedures     Results:  No results found for this or any " previous visit (from the past 24 hour(s)).     Assessment and Plan     Assessment/Plan:  Diagnoses and all orders for this visit:    1. TMJ (temporomandibular joint disorder) (Primary)  Assessment & Plan:  Symptoms likely secondary to TMJ.  Patient does have intolerance to NSAIDs.  Therefore at this time would recommend continuing Tylenol, topical Voltaren, and muscle relaxer at night for 10 to 14 days.  Will refer to ENT to establish care and discuss alternative therapies.  Encourage patient to talk further with dentist to discuss possible mouthguard for teeth grinding.  Reviewed handout with patient regarding TMJ exercises    Orders:  -     cyclobenzaprine (FLEXERIL) 5 MG tablet; Take 1 tablet by mouth Every Night.  Dispense: 30 tablet; Refill: 0  -     Ambulatory Referral to ENT (Otolaryngology)    2. Episodic tension-type headache, not intractable  Assessment & Plan:  Headaches are newly identified.    Plan:  Continue same medication/s without change.     Discussed medication dosage, use, side effects, and goals of treatment in detail.    Discussed monitoring symptoms and use of quick-relief medications and maintenance medication.  Counseled patient on lifestyle modifications to help control headaches.     General Treatment Goals:   symptom prevention  minimize work absence  minimizing limitation in activity  prevention of exacerbations  decrease use of ER/inpatient care  minimization of adverse effects of treatment    Followup in 4 weeks                      Follow Up  Return in about 7 weeks (around 11/26/2024) for Recheck.    Flaquita Kimball DO   [facility]

## 2024-10-07 NOTE — ASSESSMENT & PLAN NOTE
Symptoms likely secondary to TMJ.  Patient does have intolerance to NSAIDs.  Therefore at this time would recommend continuing Tylenol, topical Voltaren, and muscle relaxer at night for 10 to 14 days.  Will refer to ENT to establish care and discuss alternative therapies.  Encourage patient to talk further with dentist to discuss possible mouthguard for teeth grinding.  Reviewed handout with patient regarding TMJ exercises

## 2024-10-07 NOTE — ASSESSMENT & PLAN NOTE
Headaches are newly identified.    Plan:  Continue same medication/s without change.     Discussed medication dosage, use, side effects, and goals of treatment in detail.    Discussed monitoring symptoms and use of quick-relief medications and maintenance medication.  Counseled patient on lifestyle modifications to help control headaches.     General Treatment Goals:   symptom prevention  minimize work absence  minimizing limitation in activity  prevention of exacerbations  decrease use of ER/inpatient care  minimization of adverse effects of treatment    Followup in 4 weeks

## 2024-10-14 DIAGNOSIS — G44.211 INTRACTABLE EPISODIC TENSION-TYPE HEADACHE: Primary | ICD-10-CM

## 2024-10-15 ENCOUNTER — TELEPHONE (OUTPATIENT)
Dept: FAMILY MEDICINE CLINIC | Facility: CLINIC | Age: 34
End: 2024-10-15
Payer: COMMERCIAL

## 2024-10-15 ENCOUNTER — HOSPITAL ENCOUNTER (OUTPATIENT)
Facility: HOSPITAL | Age: 34
Discharge: HOME | End: 2024-10-15
Admitting: FAMILY MEDICINE
Payer: COMMERCIAL

## 2024-10-15 DIAGNOSIS — G44.211 INTRACTABLE EPISODIC TENSION-TYPE HEADACHE: ICD-10-CM

## 2024-10-15 PROCEDURE — 70551 MRI BRAIN STEM W/O DYE: CPT

## 2024-10-15 NOTE — TELEPHONE ENCOUNTER
Called the patient. Let her know patient's MRI Brain WO Contrast is scheduled for today, 10/15/24 @ 6:00pm (arrive by 5:30pm) @ [facility], [address], [address], [place], [place], [phone]. No metal or jewelry, bring a list of medications.  Patient understood and will be at the appointment.

## 2025-06-16 ENCOUNTER — OFFICE VISIT (OUTPATIENT)
Dept: FAMILY MEDICINE CLINIC | Facility: CLINIC | Age: 35
End: 2025-06-16
Payer: COMMERCIAL

## 2025-06-16 VITALS
BODY MASS INDEX: 25.02 KG/M2 | SYSTOLIC BLOOD PRESSURE: 110 MMHG | HEIGHT: 63 IN | OXYGEN SATURATION: 98 % | TEMPERATURE: 98.4 F | DIASTOLIC BLOOD PRESSURE: 66 MMHG | WEIGHT: 141.2 LBS | HEART RATE: 75 BPM

## 2025-06-16 DIAGNOSIS — R05.1 ACUTE COUGH: ICD-10-CM

## 2025-06-16 DIAGNOSIS — J06.9 VIRAL URI: Primary | ICD-10-CM

## 2025-06-16 LAB
EXPIRATION DATE: NORMAL
FLUAV AG UPPER RESP QL IA.RAPID: NOT DETECTED
FLUBV AG UPPER RESP QL IA.RAPID: NOT DETECTED
INTERNAL CONTROL: NORMAL
Lab: NORMAL
SARS-COV-2 AG UPPER RESP QL IA.RAPID: NOT DETECTED

## 2025-06-16 PROCEDURE — 99213 OFFICE O/P EST LOW 20 MIN: CPT

## 2025-06-16 PROCEDURE — 87428 SARSCOV & INF VIR A&B AG IA: CPT

## 2025-06-16 RX ORDER — ONDANSETRON 4 MG/1
4 TABLET, ORALLY DISINTEGRATING ORAL EVERY 6 HOURS PRN
Qty: 30 TABLET | Refills: 0 | Status: SHIPPED | OUTPATIENT
Start: 2025-06-16

## 2025-06-16 RX ORDER — GUAIFENESIN, PSEUDOEPHEDRINE HYDROCHLORIDE 600; 60 MG/1; MG/1
1 TABLET, EXTENDED RELEASE ORAL EVERY 12 HOURS
Qty: 10 TABLET | Refills: 0 | Status: SHIPPED | OUTPATIENT
Start: 2025-06-16

## 2025-06-16 NOTE — PROGRESS NOTES
Office Note     Name: Shawnee Walker    : 1990     MRN: [id number]     Chief Complaint  Fever (Exposed to flu (undiagnosed) from nephew/Sinus pressure, body aches, congestion, fever, some nausea (started this morning))    Subjective     History of Present Illness:  Shawnee Walker is a 35 y.o. female who presents today for acute concern of fever and nausea.    Patient reports a 1 day history of nausea, body aches, pain, headache, and fever to 101 Fahrenheit.  She states her nephew spent the night on Friday night and vomited at her house.  His parents endorsed a case of influenza at school recently.  She worries about exposure to influenza today.  She has 2 young children and they plan to leave town later this week for a trip, and she is trying to manage symptoms currently.  She denies chills, cough, trouble breathing, vomiting.    Review of Systems:   Review of Systems   Constitutional:  Positive for fatigue and fever. Negative for chills.   HENT:  Negative for congestion, ear pain, rhinorrhea, sinus pain and sore throat.    Respiratory:  Negative for cough, shortness of breath and wheezing.    Cardiovascular:  Negative for chest pain.   Gastrointestinal:  Negative for abdominal pain, constipation, diarrhea, nausea and vomiting.   Musculoskeletal:  Positive for myalgias.   Neurological:  Positive for headaches. Negative for dizziness.        Past Medical History:   Past Medical History:   Diagnosis Date    GERD (gastroesophageal reflux disease)     Heartburn     Hyperlipidemia 2019    Irritable bowel syndrome     PAC (premature atrial contraction)     has had echo in past, no current treatments.    Ulcer 2006       Past Surgical History:   Past Surgical History:   Procedure Laterality Date    UPPER GASTROINTESTINAL ENDOSCOPY  , 2016    WISDOM TOOTH EXTRACTION         Family History:   Family History   Problem Relation Age of Onset    Cirrhosis Father     Alcohol  "abuse Father        Social History:   Social History     Socioeconomic History    Marital status:    Tobacco Use    Smoking status: Never    Smokeless tobacco: Never   Vaping Use    Vaping status: Never Used   Substance and Sexual Activity    Alcohol use: Yes     Alcohol/week: 4.0 standard drinks of alcohol     Types: 2 Glasses of wine, 2 Drinks containing 0.5 oz of alcohol per week     Comment: occasionally    Drug use: No    Sexual activity: Yes     Partners: Male     Birth control/protection: None       Immunizations:   Immunization History   Administered Date(s) Administered    31-influenza Vac Quardvalent Preservativ 09/28/2020    Flu Vaccine Intradermal Quad 18-64YR 10/02/2023    Hep B, Adolescent or Pediatric 03/02/2006, 04/28/2006, 10/09/2006    Hepatitis A 02/21/2019    MCV4 Unspecified 10/09/2006    MMR 07/16/1999    Td (TDVAX) 03/02/2006    Tdap 01/15/2009, 01/30/2020    Varicella 01/15/2009        Medications:     Current Outpatient Medications:     albuterol sulfate  (90 Base) MCG/ACT inhaler, Inhale 2 puffs Every 4 (Four) Hours As Needed for Wheezing or Shortness of Air., Disp: 8 g, Rfl: 1    docusate sodium (COLACE) 100 MG capsule, Take 1 capsule by mouth 2 (Two) Times a Day., Disp: 20 capsule, Rfl: 0    esomeprazole (nexIUM) 40 MG capsule, Take 1 capsule by mouth Every Morning Before Breakfast., Disp: , Rfl:     ondansetron ODT (ZOFRAN-ODT) 4 MG disintegrating tablet, Place 1 tablet on the tongue Every 6 (Six) Hours As Needed for Nausea., Disp: 30 tablet, Rfl: 0    pseudoephedrine-guaifenesin (MUCINEX D)  MG per 12 hr tablet, Take 1 tablet by mouth Every 12 (Twelve) Hours., Disp: 10 tablet, Rfl: 0    Allergies:   No Known Allergies    Objective     Vital Signs  /66   Pulse 75   Temp 98.4 °F (36.9 °C) (Oral)   Ht 160 cm (62.99\")   Wt 64 kg (141 lb 3.2 oz)   SpO2 98%   BMI 25.02 kg/m²   Estimated body mass index is 25.02 kg/m² as calculated from the following:    Height " "as of this encounter: 160 cm (62.99\").    Weight as of this encounter: 64 kg (141 lb 3.2 oz).           Physical Exam  Vitals reviewed.   Constitutional:       General: She is not in acute distress.     Appearance: Normal appearance. She is not ill-appearing.   HENT:      Head: Normocephalic and atraumatic.      Nose: Congestion present.      Mouth/Throat:      Mouth: Mucous membranes are moist.      Pharynx: Oropharynx is clear. No oropharyngeal exudate or posterior oropharyngeal erythema.   Eyes:      Pupils: Pupils are equal, round, and reactive to light.   Cardiovascular:      Rate and Rhythm: Normal rate and regular rhythm.      Pulses: Normal pulses.      Heart sounds: Normal heart sounds. No murmur heard.  Pulmonary:      Effort: Pulmonary effort is normal. No respiratory distress.      Breath sounds: Normal breath sounds. No wheezing or rales.   Lymphadenopathy:      Cervical: No cervical adenopathy.   Neurological:      Mental Status: She is alert.          Procedures     Results:  Recent Results (from the past 24 hours)   POCT SARS-CoV-2 + Flu Antigen RADHA    Collection Time: 06/16/25 12:52 PM    Specimen: Swab   Result Value Ref Range    SARS Antigen Not Detected Not Detected, Presumptive Negative    Influenza A Antigen RADHA Not Detected Not Detected    Influenza B Antigen RADHA Not Detected Not Detected    Internal Control Passed Passed    Lot Number [id number]     Expiration Date 03/05/2026         Assessment and Plan     Assessment/Plan:  Assessment & Plan  Viral URI  Acute cough  Negative influenza, COVID testing. Diagnosis of viral URI based on symptoms and duration. Treat symptomatically. Educated patient on expected duration of symptoms and instructed to return if symptoms worsen or do not improve 7-10 days after onset.        Orders:    POCT SARS-CoV-2 + Flu Antigen RADHA    pseudoephedrine-guaifenesin (MUCINEX D)  MG per 12 hr tablet; Take 1 tablet by mouth Every 12 (Twelve) Hours.        Follow " Up  Return if symptoms worsen or fail to improve.        Jeanne Dillard PA-C   [facility]